# Patient Record
Sex: FEMALE | Race: WHITE | NOT HISPANIC OR LATINO | Employment: OTHER | RURAL
[De-identification: names, ages, dates, MRNs, and addresses within clinical notes are randomized per-mention and may not be internally consistent; named-entity substitution may affect disease eponyms.]

---
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
## 2022-01-24 ENCOUNTER — OFFICE VISIT (OUTPATIENT)
Dept: CARDIOLOGY | Facility: CLINIC | Age: 22
End: 2022-01-24

## 2022-01-24 VITALS
WEIGHT: 122 LBS | HEIGHT: 64 IN | SYSTOLIC BLOOD PRESSURE: 119 MMHG | HEART RATE: 73 BPM | BODY MASS INDEX: 20.83 KG/M2 | DIASTOLIC BLOOD PRESSURE: 66 MMHG

## 2022-01-24 DIAGNOSIS — R55 SYNCOPE, UNSPECIFIED SYNCOPE TYPE: Primary | ICD-10-CM

## 2022-01-24 DIAGNOSIS — R00.2 PALPITATIONS: ICD-10-CM

## 2022-01-24 PROCEDURE — 99212 OFFICE O/P EST SF 10 MIN: CPT | Performed by: SPECIALIST

## 2022-01-24 NOTE — PROGRESS NOTES
[facility]  Cardiology progress Note    Patient Name: Genesis Carver  : 2000    CHIEF COMPLAINT  Syncope.      Subjective   Subjective     HISTORY OF PRESENT ILLNESS    Genesis Carver is a 21 y.o. female with history of syncope.  Said no syncopal or presyncopal spell for the last year or so.    Review of Systems:   Constitutional no fever,  no weight loss   Skin no rash   Otolaryngeal no difficulty swallowing   Cardiovascular See HPI   Pulmonary no cough, no sputum production   Gastrointestinal no constipation, no diarrhea   Genitourinary no dysuria, no hematuria   Hematologic no easy bruisability, no abnormal bleeding   Musculoskeletal no muscle pain   Neurologic no dizziness, no falls         Personal History     Social History:  reports that she has never smoked. She has never used smokeless tobacco. She reports that she does not drink alcohol and does not use drugs.    Home Medications:  No current outpatient medications on file prior to visit.     No current facility-administered medications on file prior to visit.     Allergies:  No Known Allergies    Objective    Objective       Vitals:   Heart Rate:  [73] 73  BP: (119)/(66) 119/66  Body mass index is 20.94 kg/m².     Physical Exam:   Constitutional: Awake, alert, No acute distress    Eyes: PERRLA, sclerae anicteric, no conjunctival injection   HENT: NCAT, mucous membranes moist   Neck: Supple, no thyromegaly, no lymphadenopathy, trachea midline   Respiratory: Clear to auscultation bilaterally, nonlabored respirations    Cardiovascular: RRR, no murmurs or rubs. Palpable pedal pulses bilaterally   Musculoskeletal: No bilateral ankle edema, no cyanosis to extremities   Psychiatric: Appropriate affect, cooperative   Neurologic: Oriented x 3, strength symmetric in all extremities, Cranial Nerves grossly intact to confrontation, speech clear   Skin: No rashes.    Result Review    Result Review:  I have personally reviewed the available results  from  [x]  Laboratory  [x]  EKG  [x]  Cardiology  [x]  Medications  [x]  Old records  []  Other:   Procedures     Impression/Plan:  1.  Syncope unspecified: 24-hour Holter showed no significant arrhythmias.  Echocardiogram is within normal limits.  No further syncope.  Lifestyle modification advised again.           Иван Bang MD   01/24/22   11:22 EST

## 2022-09-09 ENCOUNTER — INITIAL PRENATAL (OUTPATIENT)
Dept: OBSTETRICS AND GYNECOLOGY | Facility: CLINIC | Age: 22
End: 2022-09-09

## 2022-09-09 VITALS — WEIGHT: 120 LBS | DIASTOLIC BLOOD PRESSURE: 77 MMHG | BODY MASS INDEX: 20.6 KG/M2 | SYSTOLIC BLOOD PRESSURE: 122 MMHG

## 2022-09-09 DIAGNOSIS — O20.0 THREATENED ABORTION: ICD-10-CM

## 2022-09-09 DIAGNOSIS — Z34.80 SUPERVISION OF OTHER NORMAL PREGNANCY, ANTEPARTUM: Primary | ICD-10-CM

## 2022-09-09 DIAGNOSIS — Z32.01 PREGNANCY TEST POSITIVE: ICD-10-CM

## 2022-09-09 LAB
ABO GROUP BLD: NORMAL
B-HCG UR QL: POSITIVE
BASOPHILS # BLD AUTO: 0.02 10*3/MM3 (ref 0–0.2)
BASOPHILS NFR BLD AUTO: 0.3 % (ref 0–1.5)
BLD GP AB SCN SERPL QL: NEGATIVE
C TRACH RRNA CVX QL NAA+PROBE: NOT DETECTED
DEPRECATED RDW RBC AUTO: 39.1 FL (ref 37–54)
EOSINOPHIL # BLD AUTO: 0.06 10*3/MM3 (ref 0–0.4)
EOSINOPHIL NFR BLD AUTO: 0.8 % (ref 0.3–6.2)
ERYTHROCYTE [DISTWIDTH] IN BLOOD BY AUTOMATED COUNT: 12.1 % (ref 12.3–15.4)
EXPIRATION DATE: ABNORMAL
GLUCOSE UR STRIP-MCNC: NEGATIVE MG/DL
HBV SURFACE AG SERPL QL IA: NORMAL
HCT VFR BLD AUTO: 42.9 % (ref 34–46.6)
HCV AB SER DONR QL: NORMAL
HGB BLD-MCNC: 13.6 G/DL (ref 12–15.9)
HIV1+2 AB SER QL: NORMAL
IMM GRANULOCYTES # BLD AUTO: 0.02 10*3/MM3 (ref 0–0.05)
IMM GRANULOCYTES NFR BLD AUTO: 0.3 % (ref 0–0.5)
INTERNAL NEGATIVE CONTROL: ABNORMAL
INTERNAL POSITIVE CONTROL: ABNORMAL
LYMPHOCYTES # BLD AUTO: 2.45 10*3/MM3 (ref 0.7–3.1)
LYMPHOCYTES NFR BLD AUTO: 32.6 % (ref 19.6–45.3)
Lab: ABNORMAL
MCH RBC QN AUTO: 28 PG (ref 26.6–33)
MCHC RBC AUTO-ENTMCNC: 31.7 G/DL (ref 31.5–35.7)
MCV RBC AUTO: 88.3 FL (ref 79–97)
MONOCYTES # BLD AUTO: 0.54 10*3/MM3 (ref 0.1–0.9)
MONOCYTES NFR BLD AUTO: 7.2 % (ref 5–12)
N GONORRHOEA RRNA SPEC QL NAA+PROBE: NOT DETECTED
NEUTROPHILS NFR BLD AUTO: 4.42 10*3/MM3 (ref 1.7–7)
NEUTROPHILS NFR BLD AUTO: 58.8 % (ref 42.7–76)
NRBC BLD AUTO-RTO: 0 /100 WBC (ref 0–0.2)
PLATELET # BLD AUTO: 222 10*3/MM3 (ref 140–450)
PMV BLD AUTO: 11.5 FL (ref 6–12)
PROT UR STRIP-MCNC: NEGATIVE MG/DL
RBC # BLD AUTO: 4.86 10*6/MM3 (ref 3.77–5.28)
RH BLD: POSITIVE
WBC NRBC COR # BLD: 7.51 10*3/MM3 (ref 3.4–10.8)

## 2022-09-09 PROCEDURE — G0432 EIA HIV-1/HIV-2 SCREEN: HCPCS | Performed by: OBSTETRICS & GYNECOLOGY

## 2022-09-09 PROCEDURE — 87491 CHLMYD TRACH DNA AMP PROBE: CPT | Performed by: OBSTETRICS & GYNECOLOGY

## 2022-09-09 PROCEDURE — 87086 URINE CULTURE/COLONY COUNT: CPT | Performed by: OBSTETRICS & GYNECOLOGY

## 2022-09-09 PROCEDURE — 87591 N.GONORRHOEAE DNA AMP PROB: CPT | Performed by: OBSTETRICS & GYNECOLOGY

## 2022-09-09 PROCEDURE — 86803 HEPATITIS C AB TEST: CPT | Performed by: OBSTETRICS & GYNECOLOGY

## 2022-09-09 PROCEDURE — 83020 HEMOGLOBIN ELECTROPHORESIS: CPT | Performed by: OBSTETRICS & GYNECOLOGY

## 2022-09-09 PROCEDURE — 81025 URINE PREGNANCY TEST: CPT | Performed by: OBSTETRICS & GYNECOLOGY

## 2022-09-09 PROCEDURE — 0501F PRENATAL FLOW SHEET: CPT | Performed by: OBSTETRICS & GYNECOLOGY

## 2022-09-09 NOTE — PROGRESS NOTES
OB Initial Visit    CC- Here for care of current pregnancy     Irregular menses, due date not determined at this time.  Ultrasound at [facility] noted yolk sac intrauterine and fetal pole.    Subjective:  22 y.o.  presenting for her first obstetrical visit.    LMP: Patient's last menstrual period was 2022. irregular    COMPLAINS OF: Vaginal spotting yesterday x1    Reviewed and updated:  OBHx, GYNHx (STDs), PMHx, Medications, Allergies, PSHx, Social Hx, Preventative Hx (PAP), Hx of abuse/safe environment, Vaccine Hx including hx of chickenpox or vaccine, Genetic Hx (pt, FOB, both families).        Objective:  /77   Wt 54.4 kg (120 lb)   LMP 2022   BMI 20.60 kg/m²   General- NAD, alert and oriented, appropriate  Psych- Normal mood, good memory  Neck- No masses, no thyroid enlargement  CV- Regular rhythm, no murnurs  Resp- CTA to bases, no wheezes  Abdomen- Soft,     External genitalia- Normal, no lesions  Urethra- Normal, no masses, non tender  Vagina- Normal, no discharge  Bladder- Normal, no masses, non tender  Cvx- Normal, no lesions, no discharge, no CMT  Uterus- Normal shape and consistency, non tender  Adnexa- Normal, no mass, non tender    Lymphatic- No palpable neck, axillary, or groin nodes  Ext- No edema, no cyanosis    Skin- No lesions, no rashes, no acanthosis nigricans      Assessment and Plan:  Diagnoses and all orders for this visit:    1. Supervision of other normal pregnancy, antepartum (Primary)  -     POC Urinalysis Dipstick  -     OB Panel With HIV  -     Hemoglobinopathy Fractionation Cascade  -     Chlamydia trachomatis, Neisseria gonorrhoeae, PCR - , Cervix  -     Urine Culture - Urine, Urine, Clean Catch    2. Pregnancy test positive  -     POC Pregnancy, Urine    3. Threatened   -     US Ob 14 + Weeks Single or First Gestation; Future        9w1d     Genetic Screening: Counseled.  Declines all.     Vaccines: Declines COVID vaccine    Counseling: Nutrition  discussed, calories, activity/exercise in pregnancy  Discussed dietary restrictions/safety food preparation in pregnancy  Appropriate trimester precautions provided, N/V, vag bleeding, cramping    Labs: Prenatal labs, cultures, and PAP performed (prn)    Return in about 2 weeks (around 9/23/2022).    Ricardo Swain Sr., MD  09/09/2022

## 2022-09-10 LAB
BACTERIA SPEC AEROBE CULT: NO GROWTH
RPR SER QL: NORMAL

## 2022-09-11 LAB — RUBV IGG SERPL IA-ACNC: 1.09 INDEX

## 2022-09-12 LAB
HGB A MFR BLD ELPH: 97.7 % (ref 96.4–98.8)
HGB A2 MFR BLD ELPH: 2.3 % (ref 1.8–3.2)
HGB F MFR BLD ELPH: 0 % (ref 0–2)
HGB FRACT BLD-IMP: NORMAL
HGB S MFR BLD ELPH: 0 %

## 2022-09-14 ENCOUNTER — APPOINTMENT (OUTPATIENT)
Dept: ULTRASOUND IMAGING | Facility: HOSPITAL | Age: 22
End: 2022-09-14

## 2022-09-14 ENCOUNTER — HOSPITAL ENCOUNTER (EMERGENCY)
Facility: HOSPITAL | Age: 22
Discharge: HOME | End: 2022-09-14
Attending: EMERGENCY MEDICINE | Admitting: EMERGENCY MEDICINE

## 2022-09-14 VITALS
SYSTOLIC BLOOD PRESSURE: 119 MMHG | OXYGEN SATURATION: 100 % | DIASTOLIC BLOOD PRESSURE: 67 MMHG | HEIGHT: 64 IN | TEMPERATURE: 98.4 F | WEIGHT: 119.71 LBS | HEART RATE: 86 BPM | RESPIRATION RATE: 18 BRPM | BODY MASS INDEX: 20.44 KG/M2

## 2022-09-14 DIAGNOSIS — O03.4 INCOMPLETE MISCARRIAGE: Primary | ICD-10-CM

## 2022-09-14 LAB
ABO GROUP BLD: NORMAL
ALBUMIN SERPL-MCNC: 4.9 G/DL (ref 3.5–5.2)
ALBUMIN/GLOB SERPL: 2.1 G/DL
ALP SERPL-CCNC: 77 U/L (ref 39–117)
ALT SERPL W P-5'-P-CCNC: 20 U/L (ref 1–33)
ANION GAP SERPL CALCULATED.3IONS-SCNC: 11.7 MMOL/L (ref 5–15)
AST SERPL-CCNC: 20 U/L (ref 1–32)
BACTERIA UR QL AUTO: ABNORMAL /HPF
BASOPHILS # BLD AUTO: 0.02 10*3/MM3 (ref 0–0.2)
BASOPHILS NFR BLD AUTO: 0.2 % (ref 0–1.5)
BILIRUB SERPL-MCNC: 0.2 MG/DL (ref 0–1.2)
BILIRUB UR QL STRIP: NEGATIVE
BUN SERPL-MCNC: 13 MG/DL (ref 6–20)
BUN/CREAT SERPL: 20 (ref 7–25)
CALCIUM SPEC-SCNC: 9.1 MG/DL (ref 8.6–10.5)
CHLORIDE SERPL-SCNC: 104 MMOL/L (ref 98–107)
CLARITY UR: CLEAR
CO2 SERPL-SCNC: 24.3 MMOL/L (ref 22–29)
COLOR UR: YELLOW
CREAT SERPL-MCNC: 0.65 MG/DL (ref 0.57–1)
DEPRECATED RDW RBC AUTO: 39.6 FL (ref 37–54)
EGFRCR SERPLBLD CKD-EPI 2021: 129.4 ML/MIN/1.73
EOSINOPHIL # BLD AUTO: 0.07 10*3/MM3 (ref 0–0.4)
EOSINOPHIL NFR BLD AUTO: 0.8 % (ref 0.3–6.2)
ERYTHROCYTE [DISTWIDTH] IN BLOOD BY AUTOMATED COUNT: 12.6 % (ref 12.3–15.4)
GLOBULIN UR ELPH-MCNC: 2.3 GM/DL
GLUCOSE SERPL-MCNC: 82 MG/DL (ref 65–99)
GLUCOSE UR STRIP-MCNC: NEGATIVE MG/DL
HCG INTACT+B SERPL-ACNC: 694.4 MIU/ML
HCT VFR BLD AUTO: 40.3 % (ref 34–46.6)
HGB BLD-MCNC: 13.2 G/DL (ref 12–15.9)
HGB UR QL STRIP.AUTO: ABNORMAL
HOLD SPECIMEN: 11
HOLD SPECIMEN: 11
HYALINE CASTS UR QL AUTO: ABNORMAL /LPF
IMM GRANULOCYTES # BLD AUTO: 0.02 10*3/MM3 (ref 0–0.05)
IMM GRANULOCYTES NFR BLD AUTO: 0.2 % (ref 0–0.5)
KETONES UR QL STRIP: NEGATIVE
LEUKOCYTE ESTERASE UR QL STRIP.AUTO: NEGATIVE
LYMPHOCYTES # BLD AUTO: 2.48 10*3/MM3 (ref 0.7–3.1)
LYMPHOCYTES NFR BLD AUTO: 29.2 % (ref 19.6–45.3)
MCH RBC QN AUTO: 28.3 PG (ref 26.6–33)
MCHC RBC AUTO-ENTMCNC: 32.8 G/DL (ref 31.5–35.7)
MCV RBC AUTO: 86.3 FL (ref 79–97)
MONOCYTES # BLD AUTO: 0.43 10*3/MM3 (ref 0.1–0.9)
MONOCYTES NFR BLD AUTO: 5.1 % (ref 5–12)
NEUTROPHILS NFR BLD AUTO: 5.48 10*3/MM3 (ref 1.7–7)
NEUTROPHILS NFR BLD AUTO: 64.5 % (ref 42.7–76)
NITRITE UR QL STRIP: NEGATIVE
NRBC BLD AUTO-RTO: 0 /100 WBC (ref 0–0.2)
PH UR STRIP.AUTO: 5.5 [PH] (ref 5–8)
PLATELET # BLD AUTO: 245 10*3/MM3 (ref 140–450)
PMV BLD AUTO: 10.2 FL (ref 6–12)
POTASSIUM SERPL-SCNC: 4 MMOL/L (ref 3.5–5.2)
PROT SERPL-MCNC: 7.2 G/DL (ref 6–8.5)
PROT UR QL STRIP: NEGATIVE
RBC # BLD AUTO: 4.67 10*6/MM3 (ref 3.77–5.28)
RBC # UR STRIP: ABNORMAL /HPF
REF LAB TEST METHOD: ABNORMAL
RH BLD: POSITIVE
SODIUM SERPL-SCNC: 140 MMOL/L (ref 136–145)
SP GR UR STRIP: 1.01 (ref 1–1.03)
SQUAMOUS #/AREA URNS HPF: ABNORMAL /HPF
UROBILINOGEN UR QL STRIP: ABNORMAL
WBC # UR STRIP: ABNORMAL /HPF
WBC NRBC COR # BLD: 8.5 10*3/MM3 (ref 3.4–10.8)
WHOLE BLOOD HOLD COAG: 11
WHOLE BLOOD HOLD SPECIMEN: 11

## 2022-09-14 PROCEDURE — 86900 BLOOD TYPING SEROLOGIC ABO: CPT | Performed by: EMERGENCY MEDICINE

## 2022-09-14 PROCEDURE — 80053 COMPREHEN METABOLIC PANEL: CPT | Performed by: EMERGENCY MEDICINE

## 2022-09-14 PROCEDURE — 86901 BLOOD TYPING SEROLOGIC RH(D): CPT | Performed by: EMERGENCY MEDICINE

## 2022-09-14 PROCEDURE — 85025 COMPLETE CBC W/AUTO DIFF WBC: CPT

## 2022-09-14 PROCEDURE — 87086 URINE CULTURE/COLONY COUNT: CPT | Performed by: EMERGENCY MEDICINE

## 2022-09-14 PROCEDURE — 99283 EMERGENCY DEPT VISIT LOW MDM: CPT

## 2022-09-14 PROCEDURE — 76817 TRANSVAGINAL US OBSTETRIC: CPT

## 2022-09-14 PROCEDURE — 84702 CHORIONIC GONADOTROPIN TEST: CPT

## 2022-09-14 PROCEDURE — 81001 URINALYSIS AUTO W/SCOPE: CPT | Performed by: EMERGENCY MEDICINE

## 2022-09-14 PROCEDURE — 36415 COLL VENOUS BLD VENIPUNCTURE: CPT

## 2022-09-14 RX ORDER — SODIUM CHLORIDE 0.9 % (FLUSH) 0.9 %
10 SYRINGE (ML) INJECTION AS NEEDED
Status: DISCONTINUED | OUTPATIENT
Start: 2022-09-14 | End: 2022-09-14 | Stop reason: HOSPADM

## 2022-09-14 NOTE — ED PROVIDER NOTES
Time: 3:49 PM EDT  Arrived by: private car  Chief Complaint: Vaginal bleeding  History provided by: patient, spouse  History is limited by: N/A     History of Present Illness:  Patient is a 22 y.o. year old female who presents to the emergency department with worsening vaginal bleeding and abdominal cramping x 1 week. Pt is about 7 weeks pregnant. She was recently seen by [facility] for similar symptoms.       History provided by:  Patient and spouse   used: No        Similar Symptoms Previously: yes  Recently seen: yes      Patient Care Team  Primary Care Provider: Brooke Merrill APRN    Past Medical History:     No Known Allergies  Past Medical History:   Diagnosis Date   • Reflex tachycardia      No past surgical history on file.  Family History   Problem Relation Age of Onset   • No Known Problems Father    • Breast cancer Neg Hx    • Ovarian cancer Neg Hx    • Uterine cancer Neg Hx    • Colon cancer Neg Hx    • Melanoma Neg Hx    • Prostate cancer Neg Hx        Home Medications:  Prior to Admission medications    Medication Sig Start Date End Date Taking? Authorizing Provider   Prenatal MV-Min-Fe Fum-FA-DHA (PRENATAL+DHA PO) Take  by mouth.    Provider, Mirian, MD        Social History:   Social History     Tobacco Use   • Smoking status: Never Smoker   • Smokeless tobacco: Never Used   Vaping Use   • Vaping Use: Former   • Substances: Nicotine   • Devices: Refillable tank   Substance Use Topics   • Alcohol use: Never   • Drug use: Never     Recent travel: no     Review of Systems:  Review of Systems   Constitutional: Negative for chills and fever.   HENT: Negative for congestion, ear pain and sore throat.    Eyes: Negative for pain.   Respiratory: Negative for cough, chest tightness and shortness of breath.    Cardiovascular: Negative for chest pain.   Gastrointestinal: Positive for abdominal pain. Negative for diarrhea, nausea and vomiting.   Genitourinary: Positive for  "vaginal bleeding. Negative for flank pain and hematuria.   Musculoskeletal: Negative for joint swelling.   Skin: Negative for pallor.   Neurological: Negative for seizures and headaches.   All other systems reviewed and are negative.       Physical Exam:  /67 (BP Location: Right arm)   Pulse 86   Temp 98.4 °F (36.9 °C) (Oral)   Resp 18   Ht 162.6 cm (64\")   Wt 54.3 kg (119 lb 11.4 oz)   LMP 07/07/2022   SpO2 100%   BMI 20.55 kg/m²     Physical Exam  Vitals and nursing note reviewed.   Constitutional:       General: She is not in acute distress.     Appearance: Normal appearance. She is not toxic-appearing.   HENT:      Head: Normocephalic and atraumatic.      Jaw: There is normal jaw occlusion.   Eyes:      General: Lids are normal.      Extraocular Movements: Extraocular movements intact.      Conjunctiva/sclera: Conjunctivae normal.      Pupils: Pupils are equal, round, and reactive to light.   Cardiovascular:      Rate and Rhythm: Normal rate and regular rhythm.      Pulses: Normal pulses.      Heart sounds: Normal heart sounds.   Pulmonary:      Effort: Pulmonary effort is normal. No respiratory distress.      Breath sounds: Normal breath sounds. No wheezing or rhonchi.   Abdominal:      General: Abdomen is flat.      Palpations: Abdomen is soft.      Tenderness: There is no abdominal tenderness. There is no guarding or rebound.   Musculoskeletal:         General: Normal range of motion.      Cervical back: Normal range of motion and neck supple.      Right lower leg: No edema.      Left lower leg: No edema.   Skin:     General: Skin is warm and dry.   Neurological:      Mental Status: She is alert and oriented to person, place, and time. Mental status is at baseline.   Psychiatric:         Mood and Affect: Mood normal.                Medications in the Emergency Department:  Medications   sodium chloride 0.9 % flush 10 mL (has no administration in time range)        Labs  Lab Results (last 24 " hours)     Procedure Component Value Units Date/Time    CBC & Differential [[id number]]  (Normal) Collected: 09/14/22 1433    Specimen: Blood Updated: 09/14/22 1456    Narrative:      The following orders were created for panel order CBC & Differential.  Procedure                               Abnormality         Status                     ---------                               -----------         ------                     CBC Auto Differential[[id number]]        Normal              Final result                 Please view results for these tests on the individual orders.    hCG, Quantitative, Pregnancy [[id number]] Collected: 09/14/22 1433    Specimen: Blood Updated: 09/14/22 1512     HCG Quantitative 694.40 mIU/mL     Narrative:      HCG Ranges by Gestational Age    Females - non-pregnant premenopausal   </= 1mIU/mL HCG  Females - postmenopausal               </= 7mIU/mL HCG    3 Weeks       5.4   -      72 mIU/mL  4 Weeks      10.2   -     708 mIU/mL  5 Weeks       217   -   8,245 mIU/mL  6 Weeks       152   -  32,177 mIU/mL  7 Weeks     4,059   - 153,767 mIU/mL  8 Weeks    31,366   - 149,094 mIU/mL  9 Weeks    59,109   - 135,901 mIU/mL  10 Weeks   44,186   - 170,409 mIU/mL  12 Weeks   27,107   - 201,615 mIU/mL  14 Weeks   24,302   -  93,646 mIU/mL  15 Weeks   12,540   -  69,747 mIU/mL  16 Weeks    8,904   -  55,332 mIU/mL  17 Weeks    8,240   -  51,793 mIU/mL  18 Weeks    9,649   -  55,271 mIU/mL    Results may be falsely decreased if patient taking Biotin.      CBC Auto Differential [[id number]]  (Normal) Collected: 09/14/22 1433    Specimen: Blood Updated: 09/14/22 1456     WBC 8.50 10*3/mm3      RBC 4.67 10*6/mm3      Hemoglobin 13.2 g/dL      Hematocrit 40.3 %      MCV 86.3 fL      MCH 28.3 pg      MCHC 32.8 g/dL      RDW 12.6 %      RDW-SD 39.6 fl      MPV 10.2 fL      Platelets 245 10*3/mm3      Neutrophil % 64.5 %      Lymphocyte % 29.2 %      Monocyte % 5.1 %      Eosinophil % 0.8 %      Basophil % 0.2 %       Immature Grans % 0.2 %      Neutrophils, Absolute 5.48 10*3/mm3      Lymphocytes, Absolute 2.48 10*3/mm3      Monocytes, Absolute 0.43 10*3/mm3      Eosinophils, Absolute 0.07 10*3/mm3      Basophils, Absolute 0.02 10*3/mm3      Immature Grans, Absolute 0.02 10*3/mm3      nRBC 0.0 /100 WBC     Comprehensive Metabolic Panel [[id number]] Collected: 09/14/22 1433    Specimen: Blood Updated: 09/14/22 1640     Glucose 82 mg/dL      BUN 13 mg/dL      Creatinine 0.65 mg/dL      Sodium 140 mmol/L      Potassium 4.0 mmol/L      Chloride 104 mmol/L      CO2 24.3 mmol/L      Calcium 9.1 mg/dL      Total Protein 7.2 g/dL      Albumin 4.90 g/dL      ALT (SGPT) 20 U/L      AST (SGOT) 20 U/L      Alkaline Phosphatase 77 U/L      Total Bilirubin 0.2 mg/dL      Globulin 2.3 gm/dL      A/G Ratio 2.1 g/dL      BUN/Creatinine Ratio 20.0     Anion Gap 11.7 mmol/L      eGFR 129.4 mL/min/1.73      Comment: National Kidney Foundation and American Society of Nephrology (ASN) Task Force recommended calculation based on the Chronic Kidney Disease Epidemiology Collaboration (CKD-EPI) equation refit without adjustment for race.       Narrative:      GFR Normal >60  Chronic Kidney Disease <60  Kidney Failure <15      Urinalysis With Culture If Indicated - Urine, Clean Catch [[id number]]  (Abnormal) Collected: 09/14/22 1707    Specimen: Urine, Clean Catch Updated: 09/14/22 1717     Color, UA Yellow     Appearance, UA Clear     pH, UA 5.5     Specific Gravity, UA 1.008     Glucose, UA Negative     Ketones, UA Negative     Bilirubin, UA Negative     Blood, UA Small (1+)     Protein, UA Negative     Leuk Esterase, UA Negative     Nitrite, UA Negative     Urobilinogen, UA 0.2 E.U./dL    Narrative:      In absence of clinical symptoms, the presence of pyuria, bacteria, and/or nitrites on the urinalysis result does not correlate with infection.    Urinalysis, Microscopic Only - Urine, Clean Catch [[id number]]  (Abnormal) Collected: 09/14/22 1707     Specimen: Urine, Clean Catch Updated: 22     RBC, UA 0-2 /HPF      WBC, UA 0-2 /HPF      Bacteria, UA None Seen /HPF      Squamous Epithelial Cells, UA 0-2 /HPF      Hyaline Casts, UA None Seen /LPF      Methodology Automated Microscopy    Urine Culture - Urine, Urine, Clean Catch [[id number]] Collected: 22    Specimen: Urine, Clean Catch Updated: 22           Imaging:  US Ob Transvaginal    Result Date: 2022  PROCEDURE: US OB TRANSVAGINAL  COMPARISON: None  INDICATIONS: early pregnancy, low abd cramping/ vaginal bleeding  TECHNIQUE: Transvaginal OB pelvic ultrasound.   FINDINGS:   A normal intrauterine gestation is not identified.  The uterus measures 6.2 cm x 4.6 cm x 4 cm.  The endometrium has a thickened heterogeneous appearance measuring 1.5 cm.  There is a 1.2 cm x 4 mm anechoic sac without evidence of fetal pole or yolk sac.  There is a small amount of pelvic free fluid adjacent to the right ovary.  Follicular cysts are present in the right ovary.  The right ovary is not well visualized.  The left ovary measures 2.6 cm x 3.4 cm x 1.7 cm.  Follicular cysts are present.   IMPRESSION: 1. A normal intrauterine gestation is not identified.  2. Findings suggest spontaneous  in progress.  The patient reportedly had a normal appearing intrauterine gestation on a recent ultrasound at [facility].   ROBIN LARKIN MD       Electronically Signed and Approved By: ROBIN LARKIN MD on 2022 at 16:56               Procedures:  Procedures    Progress                            Medical Decision Making:  MDM  Number of Diagnoses or Management Options  Incomplete miscarriage  Diagnosis management comments: In summary this is a 22-year-old female who presents to the emergency department with reports of early pregnancy with vaginal bleeding and cramping.  She reports being seen at outside hospital emergency department recently reportedly had a quantitative hCG  level of 1000.  Ultrasound at that time reportedly revealed intrauterine pregnancy.  She presents now complaining of increased vaginal bleeding now with abdominal cramping.  Work-up today reveals decreased quantitative hCG level and transvaginal ultrasound reveals an anechoic sac without fetal pole consistent with miscarriage.  Additional work-up including CBC and CMP are unremarkable.  Urinalysis is unremarkable.  Blood type Rh+.  Very strict return to ER and follow-up instructions have been provided to the patient.         Amount and/or Complexity of Data Reviewed  Clinical lab tests: reviewed  Tests in the radiology section of CPT®: reviewed    Patient Progress  Patient progress: resolved (17:07 EDT Updated patient on ultrasound results and plan to follow up with OB/GYN. Patient expressed understanding and agreement. All questions answered at this time.   )       Final diagnoses:   Incomplete miscarriage        Disposition:  ED Disposition     ED Disposition   Discharge    Condition   Stable    Comment   --             This medical record created using voice recognition software.        I, Anna Cantu, am scribing for Dr. Brian Mendes. Information recorded by the scribe has been verified and validated by the provider.       Cantu, Anna C  09/14/22 0325       Cantu, Anna C  09/14/22 171       Brian Mendes MD  09/14/22 6340

## 2022-09-15 LAB — BACTERIA SPEC AEROBE CULT: NO GROWTH

## 2022-09-27 ENCOUNTER — OFFICE VISIT (OUTPATIENT)
Dept: OBSTETRICS AND GYNECOLOGY | Facility: CLINIC | Age: 22
End: 2022-09-27

## 2022-09-27 ENCOUNTER — ROUTINE PRENATAL (OUTPATIENT)
Dept: OBSTETRICS AND GYNECOLOGY | Facility: CLINIC | Age: 22
End: 2022-09-27

## 2022-09-27 VITALS — BODY MASS INDEX: 20.77 KG/M2 | WEIGHT: 121 LBS | DIASTOLIC BLOOD PRESSURE: 64 MMHG | SYSTOLIC BLOOD PRESSURE: 105 MMHG

## 2022-09-27 VITALS
HEART RATE: 68 BPM | BODY MASS INDEX: 20.66 KG/M2 | WEIGHT: 121 LBS | SYSTOLIC BLOOD PRESSURE: 105 MMHG | DIASTOLIC BLOOD PRESSURE: 64 MMHG | HEIGHT: 64 IN

## 2022-09-27 DIAGNOSIS — Z53.21 PATIENT LEFT WITHOUT BEING SEEN: ICD-10-CM

## 2022-09-27 DIAGNOSIS — Z34.80 SUPERVISION OF OTHER NORMAL PREGNANCY, ANTEPARTUM: ICD-10-CM

## 2022-09-27 DIAGNOSIS — O03.9 COMPLETE MISCARRIAGE: Primary | ICD-10-CM

## 2022-09-27 DIAGNOSIS — N83.201 CYSTS OF BOTH OVARIES: ICD-10-CM

## 2022-09-27 DIAGNOSIS — N83.202 CYSTS OF BOTH OVARIES: ICD-10-CM

## 2022-09-27 LAB
GLUCOSE UR STRIP-MCNC: NEGATIVE MG/DL
HCG INTACT+B SERPL-ACNC: 10 MIU/ML
PROT UR STRIP-MCNC: NEGATIVE MG/DL

## 2022-09-27 PROCEDURE — 81002 URINALYSIS NONAUTO W/O SCOPE: CPT | Performed by: OBSTETRICS & GYNECOLOGY

## 2022-09-27 PROCEDURE — 84702 CHORIONIC GONADOTROPIN TEST: CPT | Performed by: OBSTETRICS & GYNECOLOGY

## 2022-09-27 PROCEDURE — 99214 OFFICE O/P EST MOD 30 MIN: CPT | Performed by: OBSTETRICS & GYNECOLOGY

## 2022-09-28 ENCOUNTER — TELEPHONE (OUTPATIENT)
Dept: OBSTETRICS AND GYNECOLOGY | Facility: CLINIC | Age: 22
End: 2022-09-28

## 2022-09-28 NOTE — TELEPHONE ENCOUNTER
Patient called back and was advised of her results. Please place the order for the ultrasound to be scheduled at the appropriate time.

## 2022-09-28 NOTE — TELEPHONE ENCOUNTER
----- Message from Indio Soliz MD sent at 9/28/2022  7:44 AM EDT -----  Please notify the patient's hCG has fallen significantly.  It is now 10.  It was 694 2 weeks ago.  This is consistent with complete passage of the pregnancy.  No further hCGs are required.  The patient will follow-up after her ultrasound to reevaluate the ovarian cyst noted on her ultrasound yesterday

## 2022-09-29 PROBLEM — N83.202 CYSTS OF BOTH OVARIES: Status: ACTIVE | Noted: 2022-09-29

## 2022-09-29 PROBLEM — N83.201 CYSTS OF BOTH OVARIES: Status: ACTIVE | Noted: 2022-09-29

## 2022-09-29 PROBLEM — O03.9 COMPLETE MISCARRIAGE: Status: ACTIVE | Noted: 2022-09-29

## 2022-09-29 NOTE — ASSESSMENT & PLAN NOTE
I reviewed the patient's ultrasound findings with her.  Both of the cystic structures noted to have a complex nature.  The patient did report having a difficult time conceiving this pregnancy thus raising the concern for a possible endometrioma versus just a hemorrhagic cyst.  We will reevaluate in 4 to 6 weeks for any interval change.  At that time we can make a decision about proceeding with any further evaluation if necessary.

## 2022-09-29 NOTE — ASSESSMENT & PLAN NOTE
The patient's symptoms and ultrasound findings are most consistent with a completed miscarriage.  We will check a beta-hCG today to confirm an appropriate decline.  I discussed miscarriage in detail and the most common etiologies of miscarriage.  We discussed timing of future pregnancies.  I have recommended the patient wait until she has at least 1 normal menstrual cycle.  I have recommended she continue multivitamin with folic acid or prenatal vitamins if planning a pregnancy anytime soon or planning not to prevent pregnancy.  The patient declines prescription contraceptives at this time.

## 2022-09-29 NOTE — PROGRESS NOTES
"GYN Visit    CC: Follow-up miscarriage    HPI:   22 y.o. who presents in follow-up from an ER visit for miscarriage.  The patient was seen on 2022 for vaginal bleeding.  At that time she was told that this was likely going to result in miscarriage.  She reports that she miscarried about 2 days later at home.  She feels confident she passed the pregnancy.  Her bleeding got very heavy with clots for short time and as soon as the pregnancy passed became significantly better.  The patient reports no vaginal bleeding at the present time.  She is here for follow-up ultrasound.      History: PMHx, Meds, Allergies, PSHx, Social Hx, and POBHx all reviewed and updated.    /64   Pulse 68   Ht 162.6 cm (64\")   Wt 54.9 kg (121 lb)   LMP 2022   Breastfeeding No   BMI 20.77 kg/m²     Physical Exam  Vitals and nursing note reviewed. Exam conducted with a chaperone present.   Constitutional:       General: She is not in acute distress.     Appearance: Normal appearance. She is normal weight. She is not ill-appearing.   Skin:     General: Skin is warm and dry.      Findings: No lesion or rash.   Neurological:      Mental Status: She is alert and oriented to person, place, and time.   Psychiatric:         Mood and Affect: Mood normal.         Behavior: Behavior normal.         Thought Content: Thought content normal.         Judgment: Judgment normal.       ED physician note reviewed from 2022.  Pelvic ultrasound from 2022 reviewed  Labs from 2022 reviewed including CMP, hCG, CBC, type and screen, urinalysis, and urine culture    ASSESSMENT AND PLAN:  Diagnoses and all orders for this visit:    1. Complete miscarriage (Primary)  Assessment & Plan:  The patient's symptoms and ultrasound findings are most consistent with a completed miscarriage.  We will check a beta-hCG today to confirm an appropriate decline.  I discussed miscarriage in detail and the most common etiologies of miscarriage.  We " discussed timing of future pregnancies.  I have recommended the patient wait until she has at least 1 normal menstrual cycle.  I have recommended she continue multivitamin with folic acid or prenatal vitamins if planning a pregnancy anytime soon or planning not to prevent pregnancy.  The patient declines prescription contraceptives at this time.      2. Cysts of both ovaries  Assessment & Plan:  I reviewed the patient's ultrasound findings with her.  Both of the cystic structures noted to have a complex nature.  The patient did report having a difficult time conceiving this pregnancy thus raising the concern for a possible endometrioma versus just a hemorrhagic cyst.  We will reevaluate in 4 to 6 weeks for any interval change.  At that time we can make a decision about proceeding with any further evaluation if necessary.    Orders:  -     US Pelvis Transvaginal Non OB; Future        Follow Up:  Return in about 4 weeks (around 10/25/2022) for Recheck.    Indio Soliz MD  09/27/2022

## 2022-10-19 NOTE — PROGRESS NOTES
The patient left the office before care was provided and did not complete the visit the appointment was canceled.

## 2022-10-26 ENCOUNTER — OFFICE VISIT (OUTPATIENT)
Dept: OBSTETRICS AND GYNECOLOGY | Facility: CLINIC | Age: 22
End: 2022-10-26

## 2022-10-26 VITALS
HEIGHT: 64 IN | SYSTOLIC BLOOD PRESSURE: 106 MMHG | WEIGHT: 122 LBS | DIASTOLIC BLOOD PRESSURE: 74 MMHG | BODY MASS INDEX: 20.83 KG/M2 | HEART RATE: 80 BPM

## 2022-10-26 DIAGNOSIS — N83.201 CYSTS OF BOTH OVARIES: Primary | ICD-10-CM

## 2022-10-26 DIAGNOSIS — N83.202 CYSTS OF BOTH OVARIES: Primary | ICD-10-CM

## 2022-10-26 PROBLEM — O03.9 COMPLETE MISCARRIAGE: Status: RESOLVED | Noted: 2022-09-29 | Resolved: 2022-10-26

## 2022-10-26 PROCEDURE — 99213 OFFICE O/P EST LOW 20 MIN: CPT | Performed by: OBSTETRICS & GYNECOLOGY

## 2022-10-26 NOTE — PROGRESS NOTES
"GYN Visit    CC: Follow-up ultrasound    HPI:   22 y.o. who presents in follow-up of a pelvic ultrasound for bilateral ovarian cyst.  The patient had a recent miscarriage and during the evaluation that miscarriage ovarian cyst were noted on both sides.  Very little description was noted on these ovarian cyst.  The patient was asymptomatic at that time.  Since her miscarriage the patient reports that her menstrual cycle has returned and is normal.  She has no concerns today.  She would like to start trying for pregnancy again.    History: PMHx, Meds, Allergies, PSHx, Social Hx, and POBHx all reviewed and updated.    /74   Pulse 80   Ht 162.6 cm (64\")   Wt 55.3 kg (122 lb)   LMP 2022   Breastfeeding No   BMI 20.94 kg/m²     Physical Exam  Vitals and nursing note reviewed.   Musculoskeletal:         General: No swelling.      Right lower leg: No edema.      Left lower leg: No edema.   Skin:     General: Skin is warm and dry.      Findings: No rash.   Neurological:      Mental Status: She is oriented to person, place, and time.   Psychiatric:         Mood and Affect: Mood normal.         Behavior: Behavior normal.         Thought Content: Thought content normal.         Judgment: Judgment normal.       Ultrasound from 10/26/2022 reviewed    ASSESSMENT AND PLAN:  Diagnoses and all orders for this visit:    1. Cysts of both ovaries (Primary)  Assessment & Plan:  Today's ultrasound demonstrates no ovarian cysts.  There are multiple follicles noted.  The patient does not have any abnormal uterine bleeding suggestive of PCOS.  There are also not enough follicles noted on each ovary to qualify for the ultrasound finding of polycystic appearing ovaries.  I discussed PCOS with the patient.  I do not suspect the patient has PCOS at this time.  I did discussed signs and symptoms of PCOS and asked the patient to return if these develop.  Patient is planning to try again for pregnancy.  I think since she has " resumed normal menstruation she can try again.  I do recommend she take prenatal vitamins daily.  She will notify me with the missed period and positive pregnancy test.          Follow Up:  Return if symptoms worsen or fail to improve.    Indio Soliz MD  10/26/2022

## 2022-10-26 NOTE — ASSESSMENT & PLAN NOTE
Today's ultrasound demonstrates no ovarian cysts.  There are multiple follicles noted.  The patient does not have any abnormal uterine bleeding suggestive of PCOS.  There are also not enough follicles noted on each ovary to qualify for the ultrasound finding of polycystic appearing ovaries.  I discussed PCOS with the patient.  I do not suspect the patient has PCOS at this time.  I did discussed signs and symptoms of PCOS and asked the patient to return if these develop.  Patient is planning to try again for pregnancy.  I think since she has resumed normal menstruation she can try again.  I do recommend she take prenatal vitamins daily.  She will notify me with the missed period and positive pregnancy test.   X Size Of Lesion In Cm (Optional): 0 Detail Level: Detailed Size Of Lesion In Cm (Optional): 0.3

## 2022-11-07 ENCOUNTER — TELEPHONE (OUTPATIENT)
Dept: SURGERY | Facility: OTHER | Age: 22
End: 2022-11-07

## 2022-11-07 NOTE — TELEPHONE ENCOUNTER
Caller: Genesis Carver    Relationship to patient: Self    Best call back number: [phone]    Patient is needing:  PATIENT WAS TOLD BY  TO CALL WHEN SHE RECEIVED A POSITIVE PREGNANCY TEST. BECAUSE OF HISTORY OF MISCARRIAGE. PATIENT STATED SHE  GOT FIRST POSITIVE ON OCT.31.2022, AND HAS GOTTEN ABOUT 5 POSITIVES SINCE.

## 2022-11-08 ENCOUNTER — LAB (OUTPATIENT)
Dept: OBSTETRICS AND GYNECOLOGY | Facility: CLINIC | Age: 22
End: 2022-11-08

## 2022-11-08 DIAGNOSIS — O20.0 THREATENED ABORTION: ICD-10-CM

## 2022-11-08 DIAGNOSIS — O20.0 THREATENED ABORTION: Primary | ICD-10-CM

## 2022-11-08 LAB — HCG INTACT+B SERPL-ACNC: 953 MIU/ML

## 2022-11-08 PROCEDURE — 84702 CHORIONIC GONADOTROPIN TEST: CPT | Performed by: OBSTETRICS & GYNECOLOGY

## 2022-11-08 NOTE — TELEPHONE ENCOUNTER
Patient called back and advised of Dr. Soliz's recommendations. She is coming in today and Thursday for the HCG labs. She understands further follow up will be based on those results.

## 2022-11-10 ENCOUNTER — LAB (OUTPATIENT)
Dept: OBSTETRICS AND GYNECOLOGY | Facility: CLINIC | Age: 22
End: 2022-11-10

## 2022-11-10 DIAGNOSIS — O20.0 THREATENED ABORTION: ICD-10-CM

## 2022-11-10 LAB — HCG INTACT+B SERPL-ACNC: 2177 MIU/ML

## 2022-11-10 PROCEDURE — 84702 CHORIONIC GONADOTROPIN TEST: CPT | Performed by: OBSTETRICS & GYNECOLOGY

## 2022-11-11 DIAGNOSIS — O20.0 THREATENED MISCARRIAGE: Primary | ICD-10-CM

## 2022-11-11 NOTE — PROGRESS NOTES
Please notify the patient that her hCG has risen appropriately.  I would like her to have a viability ultrasound in about a week, with a follow-up with me after as a GYN visit.

## 2022-11-16 ENCOUNTER — OFFICE VISIT (OUTPATIENT)
Dept: OBSTETRICS AND GYNECOLOGY | Facility: CLINIC | Age: 22
End: 2022-11-16

## 2022-11-16 VITALS
HEART RATE: 76 BPM | WEIGHT: 122 LBS | DIASTOLIC BLOOD PRESSURE: 65 MMHG | BODY MASS INDEX: 20.94 KG/M2 | SYSTOLIC BLOOD PRESSURE: 98 MMHG

## 2022-11-16 DIAGNOSIS — O20.0 THREATENED ABORTION: Primary | ICD-10-CM

## 2022-11-16 PROCEDURE — 99213 OFFICE O/P EST LOW 20 MIN: CPT | Performed by: OBSTETRICS & GYNECOLOGY

## 2022-11-16 NOTE — ASSESSMENT & PLAN NOTE
Doing well.  Ultrasound today shows a collins intrauterine pregnancy with cardiac activity.  SAB warnings reviewed.  Plan to follow-up ultrasound in 4 weeks along with new OB visit.  I have given SAB warnings and call/return instructions to the patient.  She will continue prenatal vitamins.  If her nausea increases she will notify me for medications

## 2022-11-16 NOTE — PROGRESS NOTES
GYN Visit    CC: Follow-up ultrasound    HPI:   22 y.o. who presents in follow-up of an ultrasound for an early threatened miscarriage.  The patient is doing well.  She denies any current bleeding or pelvic pain.  She does report some nausea but no vomiting.  No other concerns today.  Just recently had a miscarriage.      History: PMHx, Meds, Allergies, PSHx, Social Hx, and POBHx all reviewed and updated.    BP 98/65   Pulse 76   Wt 55.3 kg (122 lb)   LMP 10/05/2022 (Exact Date)   Breastfeeding No   BMI 20.94 kg/m²     Physical Exam  Vitals and nursing note reviewed.   Constitutional:       Appearance: Normal appearance.   Musculoskeletal:         General: No swelling.      Right lower leg: No edema.      Left lower leg: No edema.   Skin:     General: Skin is warm and dry.      Findings: No rash.   Neurological:      Mental Status: She is alert and oriented to person, place, and time.   Psychiatric:         Mood and Affect: Mood normal.         Behavior: Behavior normal.         Thought Content: Thought content normal.         Judgment: Judgment normal.           ASSESSMENT AND PLAN:  Diagnoses and all orders for this visit:    1. Threatened  (Primary)  Assessment & Plan:  Doing well.  Ultrasound today shows a collins intrauterine pregnancy with cardiac activity.  SAB warnings reviewed.  Plan to follow-up ultrasound in 4 weeks along with new OB visit.  I have given SAB warnings and call/return instructions to the patient.  She will continue prenatal vitamins.  If her nausea increases she will notify me for medications    Orders:  -     US Ob 14 + Weeks Single or First Gestation; Future      Follow Up:  Return in about 4 weeks (around 2022) for new ob.    Indio Soliz MD  2022

## 2022-11-18 ENCOUNTER — TELEPHONE (OUTPATIENT)
Dept: OBSTETRICS AND GYNECOLOGY | Facility: CLINIC | Age: 22
End: 2022-11-18

## 2022-11-18 NOTE — TELEPHONE ENCOUNTER
PT IS 6 WEEKS AND IS WANTING TO KNOW WHAT SHE CAN TAKE FOR NAUSEA. PLEASE ADVISE PT, SHE CAN BE REACHED ANYTIME, OK TO LVM.

## 2022-11-21 NOTE — TELEPHONE ENCOUNTER
Patient states she had called in over the weekend and a script or Zofran has been sent to her pharmacy.

## 2022-11-23 ENCOUNTER — TELEPHONE (OUTPATIENT)
Dept: OBSTETRICS AND GYNECOLOGY | Facility: CLINIC | Age: 22
End: 2022-11-23

## 2022-11-23 NOTE — TELEPHONE ENCOUNTER
Caller: Genesis Carver    Relationship to patient: Self    Best call back number: [phone]    Patient is needing: PATIENT WAS ADVISED BY HER PCP TO LET  KNOW SHE WAS GIVEN FLUIDS YESTERDAY AT THE PRACTICE AND A PRESCRIPTION FOR ACID REFLUX.   NO CALLBACK IS NEEDED AT THIS TIME. PATIENT IS FEELING BETTER.

## 2022-12-02 ENCOUNTER — TELEPHONE (OUTPATIENT)
Dept: OBSTETRICS AND GYNECOLOGY | Facility: CLINIC | Age: 22
End: 2022-12-02

## 2022-12-02 RX ORDER — ONDANSETRON HYDROCHLORIDE 8 MG/1
8 TABLET, FILM COATED ORAL EVERY 8 HOURS PRN
Qty: 20 TABLET | Refills: 1 | Status: SHIPPED | OUTPATIENT
Start: 2022-12-02 | End: 2022-12-20

## 2022-12-02 NOTE — TELEPHONE ENCOUNTER
JULIANNA  I had originally sent this to Dr. Soliz but he is out of the office.  The patient requests a refill on Zofran.  The patient said she had last received Zofran from an on-call nurse when she called in after-hours on 11/18/22.  She has ran out of the prescription and is still having nausea and vomiting.  Please advise.  Thank you.

## 2022-12-02 NOTE — TELEPHONE ENCOUNTER
Caller: Marquis Carveree    Relationship: Self    Best call back number: [phone]    Requested Prescriptions:   ZOFRAN -        Pharmacy where request should be sent:[address] [phone] [facility] [phone] [facility]  [phone]          Does the patient have less than a 3 day supply:  [x] Yes  [] No    Would you like a call back once the refill request has been completed: [x] Yes [] No    If the office needs to give you a call back, can they leave a voicemail: [x] Yes [] No

## 2022-12-02 NOTE — TELEPHONE ENCOUNTER
I have sent a message to her provider.  For more information, reference telephone encounter from 11/18/2022.

## 2022-12-05 ENCOUNTER — TELEPHONE (OUTPATIENT)
Dept: OBSTETRICS AND GYNECOLOGY | Facility: CLINIC | Age: 22
End: 2022-12-05

## 2022-12-05 NOTE — TELEPHONE ENCOUNTER
Patient partner called requesting an Rx for Zofran.  Informed him Rx was at the pharmacy for Zofran.

## 2022-12-15 ENCOUNTER — TELEPHONE (OUTPATIENT)
Dept: OBSTETRICS AND GYNECOLOGY | Facility: CLINIC | Age: 22
End: 2022-12-15

## 2022-12-15 NOTE — TELEPHONE ENCOUNTER
Spoke with Yossi and let him know that we did not have any other openings for ultrasounds within the next few weeks. Pt v/u.

## 2022-12-15 NOTE — TELEPHONE ENCOUNTER
Caller: INDERJIT BALDERAS    Relationship to patient: Emergency Contact/    Best call back number: [phone]    INDERJIT BALDERAS'S WIFE HAS APPT ON 12-20 FOR NEW OB. HOWEVER, HE IS STARTING THE [facility] [employer] THAT DAY & HE DOES NOT WANT TO MISS HER 1ST APPT.   COULD SOMEONE CALL HIM BACK AND R/S FOR ANY OTHER DAY NEXT WEEK?  THANKS

## 2022-12-20 ENCOUNTER — INITIAL PRENATAL (OUTPATIENT)
Dept: OBSTETRICS AND GYNECOLOGY | Facility: CLINIC | Age: 22
End: 2022-12-20

## 2022-12-20 VITALS — BODY MASS INDEX: 21.97 KG/M2 | SYSTOLIC BLOOD PRESSURE: 109 MMHG | DIASTOLIC BLOOD PRESSURE: 75 MMHG | WEIGHT: 128 LBS

## 2022-12-20 DIAGNOSIS — Z34.80 SUPERVISION OF OTHER NORMAL PREGNANCY, ANTEPARTUM: Primary | ICD-10-CM

## 2022-12-20 PROBLEM — O20.0 THREATENED ABORTION: Status: RESOLVED | Noted: 2022-11-16 | Resolved: 2022-12-20

## 2022-12-20 LAB
ABO GROUP BLD: NORMAL
BASOPHILS # BLD AUTO: 0.01 10*3/MM3 (ref 0–0.2)
BASOPHILS NFR BLD AUTO: 0.1 % (ref 0–1.5)
BLD GP AB SCN SERPL QL: NEGATIVE
C TRACH RRNA CVX QL NAA+PROBE: NOT DETECTED
DEPRECATED RDW RBC AUTO: 39.7 FL (ref 37–54)
EOSINOPHIL # BLD AUTO: 0.05 10*3/MM3 (ref 0–0.4)
EOSINOPHIL NFR BLD AUTO: 0.7 % (ref 0.3–6.2)
ERYTHROCYTE [DISTWIDTH] IN BLOOD BY AUTOMATED COUNT: 12.4 % (ref 12.3–15.4)
GLUCOSE UR STRIP-MCNC: NEGATIVE MG/DL
HBV SURFACE AG SERPL QL IA: NORMAL
HCT VFR BLD AUTO: 39.5 % (ref 34–46.6)
HCV AB SER DONR QL: NORMAL
HGB BLD-MCNC: 12.9 G/DL (ref 12–15.9)
HIV1+2 AB SER QL: NORMAL
IMM GRANULOCYTES # BLD AUTO: 0.03 10*3/MM3 (ref 0–0.05)
IMM GRANULOCYTES NFR BLD AUTO: 0.4 % (ref 0–0.5)
LYMPHOCYTES # BLD AUTO: 1.56 10*3/MM3 (ref 0.7–3.1)
LYMPHOCYTES NFR BLD AUTO: 20.8 % (ref 19.6–45.3)
MCH RBC QN AUTO: 28.4 PG (ref 26.6–33)
MCHC RBC AUTO-ENTMCNC: 32.7 G/DL (ref 31.5–35.7)
MCV RBC AUTO: 86.8 FL (ref 79–97)
MONOCYTES # BLD AUTO: 0.45 10*3/MM3 (ref 0.1–0.9)
MONOCYTES NFR BLD AUTO: 6 % (ref 5–12)
N GONORRHOEA RRNA SPEC QL NAA+PROBE: NOT DETECTED
NEUTROPHILS NFR BLD AUTO: 5.41 10*3/MM3 (ref 1.7–7)
NEUTROPHILS NFR BLD AUTO: 72 % (ref 42.7–76)
NRBC BLD AUTO-RTO: 0 /100 WBC (ref 0–0.2)
PLATELET # BLD AUTO: 220 10*3/MM3 (ref 140–450)
PMV BLD AUTO: 10.9 FL (ref 6–12)
PROT UR STRIP-MCNC: NEGATIVE MG/DL
RBC # BLD AUTO: 4.55 10*6/MM3 (ref 3.77–5.28)
RH BLD: POSITIVE
T PALLIDUM IGG SER QL: NORMAL
WBC NRBC COR # BLD: 7.51 10*3/MM3 (ref 3.4–10.8)

## 2022-12-20 PROCEDURE — 86762 RUBELLA ANTIBODY: CPT | Performed by: OBSTETRICS & GYNECOLOGY

## 2022-12-20 PROCEDURE — 85025 COMPLETE CBC W/AUTO DIFF WBC: CPT | Performed by: OBSTETRICS & GYNECOLOGY

## 2022-12-20 PROCEDURE — 86850 RBC ANTIBODY SCREEN: CPT | Performed by: OBSTETRICS & GYNECOLOGY

## 2022-12-20 PROCEDURE — 86780 TREPONEMA PALLIDUM: CPT | Performed by: OBSTETRICS & GYNECOLOGY

## 2022-12-20 PROCEDURE — 87591 N.GONORRHOEAE DNA AMP PROB: CPT | Performed by: OBSTETRICS & GYNECOLOGY

## 2022-12-20 PROCEDURE — 87086 URINE CULTURE/COLONY COUNT: CPT | Performed by: OBSTETRICS & GYNECOLOGY

## 2022-12-20 PROCEDURE — 87491 CHLMYD TRACH DNA AMP PROBE: CPT | Performed by: OBSTETRICS & GYNECOLOGY

## 2022-12-20 PROCEDURE — 86803 HEPATITIS C AB TEST: CPT | Performed by: OBSTETRICS & GYNECOLOGY

## 2022-12-20 PROCEDURE — 87340 HEPATITIS B SURFACE AG IA: CPT | Performed by: OBSTETRICS & GYNECOLOGY

## 2022-12-20 PROCEDURE — G0432 EIA HIV-1/HIV-2 SCREEN: HCPCS | Performed by: OBSTETRICS & GYNECOLOGY

## 2022-12-20 PROCEDURE — 0501F PRENATAL FLOW SHEET: CPT | Performed by: OBSTETRICS & GYNECOLOGY

## 2022-12-20 PROCEDURE — 86900 BLOOD TYPING SEROLOGIC ABO: CPT | Performed by: OBSTETRICS & GYNECOLOGY

## 2022-12-20 PROCEDURE — 86901 BLOOD TYPING SEROLOGIC RH(D): CPT | Performed by: OBSTETRICS & GYNECOLOGY

## 2022-12-20 RX ORDER — FAMOTIDINE 20 MG/1
TABLET, FILM COATED ORAL
COMMUNITY
Start: 2022-11-22 | End: 2022-12-20

## 2022-12-20 NOTE — PROGRESS NOTES
OB Initial Visit    CC- Here for care of current pregnancy       Subjective:  22 y.o.  presenting for her first obstetrical visit.    LMP: Patient's last menstrual period was 10/05/2022 (exact date).   Having some nausea and vomiting.  Overall symptoms are improving.    Reviewed and updated:  OBHx, GYNHx (STDs), PMHx, Medications, Allergies, PSHx, Social Hx, Preventative Hx (PAP), Hx of abuse/safe environment, Vaccine Hx including hx of chickenpox or vaccine, Genetic Hx (pt, FOB, both families).        Objective:  /75   Wt 58.1 kg (128 lb)   LMP 10/05/2022 (Exact Date)   BMI 21.97 kg/m²   General- NAD, alert and oriented, appropriate  Psych- Normal mood, good memory  Neck- No masses, no thyroid enlargement  CV- Regular rhythm, no murnurs  Resp- CTA to bases, no wheezes  Abdomen- Soft, non distended, non tender, no masses   External genitalia- Normal, no lesions  Urethra- Normal, no masses, non tender  Vagina- Normal, no discharge  Bladder- Normal, no masses, non tender  Cvx- Normal, no lesions, no discharge, no CMT  Uterus- Normal shape and consistency, non tender, Consistent with dates  Adnexa- Normal, no mass, non tender  Lymphatic- No palpable neck, axillary, or groin nodes  Ext- No edema, no cyanosis    Skin- No lesions, no rashes, no acanthosis nigricans      Assessment and Plan:  Diagnoses and all orders for this visit:    1. Supervision of other normal pregnancy, antepartum (Primary)  Overview:  EDC: 2023    Prenatal genetic screening: Declined    Tdap vaccine:  Flu vaccine: Recommended  COVID-19 vaccine: Recommended    Assessment & Plan:  Today's ultrasound reviewed.  EDC is consistent with previously established EDC.  Fetal heart rate normal.  No gross abnormalities.  Continue prenatal vitamins  Check prenatal labs  Schedule anatomy ultrasound for around 20 weeks of gestation  Discussed office visit schedule and call rotation  Reviewed medication safe in pregnancy  Declines prenatal  genetic screening  Reviewed nutrition, exercise, and appropriate weight gain in pregnancy    Orders:  -     POC Urinalysis Dipstick  -     Chlamydia trachomatis, Neisseria gonorrhoeae, PCR - Urine, Urine, Random Void  -     OB Panel With HIV; Future  -     Urine Culture - Urine, Urine, Clean Catch  -     US Ob Detail Fetal Anatomy Single or First Gestation; Future  -     OB Panel With HIV          10w6d    Genetic Screening: Counseled.  Declines all.     Vaccines: Recommend FLU vaccine this season, R/B discussed  Recommend COVID vaccine, R/B discussed    Counseling: Nutrition discussed, calories, activity/exercise in pregnancy  Discussed dietary restrictions/safety food preparation in pregnancy  Reviewed what to expect prenatal visits, office providers and [facility] hospitalists  Appropriate trimester precautions provided, N/V, vag bleeding, cramping  Questions answered    Return in about 4 weeks (around 1/17/2023).      Indio Soliz MD  12/20/2022

## 2022-12-20 NOTE — ASSESSMENT & PLAN NOTE
Today's ultrasound reviewed.  EDC is consistent with previously established EDC.  Fetal heart rate normal.  No gross abnormalities.  Continue prenatal vitamins  Check prenatal labs  Schedule anatomy ultrasound for around 20 weeks of gestation  Discussed office visit schedule and call rotation  Reviewed medication safe in pregnancy  Declines prenatal genetic screening  Reviewed nutrition, exercise, and appropriate weight gain in pregnancy

## 2022-12-21 ENCOUNTER — TELEPHONE (OUTPATIENT)
Dept: OBSTETRICS AND GYNECOLOGY | Facility: CLINIC | Age: 22
End: 2022-12-21

## 2022-12-21 LAB
BACTERIA SPEC AEROBE CULT: NO GROWTH
RUBV IGG SERPL IA-ACNC: 1.04 INDEX

## 2022-12-21 NOTE — TELEPHONE ENCOUNTER
Caller: SOLA BEJARANO    Relationship to patient: SELF    Best call back number: [phone]    Patient is needing: PATIENT IS 11 WEEKS PREGNANT AND WAS JUST DIAGNOSED WITH COVID. PLEASE ADVISE PATIENT ON RECOMMENDATIONS FOR MEDICATIONS THAT ARE SAFE TO TAKE DURING PREGNANCY.    PATIENT IS AVAILABLE FOR A CALL BACK AT ANYTIME AND IS FINE WITH A VOICEMAIL BEING LEFT IF SHE IS UNABLE TO ANSWER.

## 2023-01-18 ENCOUNTER — ROUTINE PRENATAL (OUTPATIENT)
Dept: OBSTETRICS AND GYNECOLOGY | Facility: CLINIC | Age: 23
End: 2023-01-18
Payer: COMMERCIAL

## 2023-01-18 VITALS — BODY MASS INDEX: 21.11 KG/M2 | DIASTOLIC BLOOD PRESSURE: 75 MMHG | SYSTOLIC BLOOD PRESSURE: 111 MMHG | WEIGHT: 123 LBS

## 2023-01-18 DIAGNOSIS — Z34.80 SUPERVISION OF OTHER NORMAL PREGNANCY, ANTEPARTUM: Primary | ICD-10-CM

## 2023-01-18 LAB
GLUCOSE UR STRIP-MCNC: NEGATIVE MG/DL
PROT UR STRIP-MCNC: NEGATIVE MG/DL

## 2023-01-18 PROCEDURE — 0502F SUBSEQUENT PRENATAL CARE: CPT | Performed by: OBSTETRICS & GYNECOLOGY

## 2023-01-18 NOTE — PROGRESS NOTES
OB FOLLOW UP    CC: Scheduled OB routine FU     Prenatal care complicated by:   Patient Active Problem List   Diagnosis   • Palpitations   • Syncope   • Cysts of both ovaries   • Supervision of other normal pregnancy, antepartum       Subjective:   Patient has: No complaints, No leaking fluid, No vaginal bleeding, No contractions  Having some acid reflux    Objective:  Urine glucose/protein- see flow sheet      /75   Wt 55.8 kg (123 lb)   LMP 10/05/2022 (Exact Date)   BMI 21.11 kg/m²   See OB flow for LE edema, and cvx exam if applicable  FHT: 146 BPM   Uterine Size: size equals dates      Assessment and Plan:  Diagnoses and all orders for this visit:    1. Supervision of other normal pregnancy, antepartum (Primary)  Overview:  EDC: 7/12/2023    Prenatal genetic screening: Declined    Tdap vaccine:  Flu vaccine: Recommended  COVID-19 vaccine: Recommended    Assessment & Plan:  Continue prenatal vitamins  Anatomy ultrasound next office visit  Reviewed prenatal labs    Orders:  -     POC Urinalysis Dipstick        15w0d  Reassuring pregnancy progress    Counseling: Second trimester precautions  OB precautions, leaking, VB, brandie ayala vs PTL/Labor    Questions answered    Return in about 4 weeks (around 2/15/2023).      Indio Soliz MD  01/18/2023

## 2023-02-03 ENCOUNTER — TELEPHONE (OUTPATIENT)
Dept: OBSTETRICS AND GYNECOLOGY | Facility: CLINIC | Age: 23
End: 2023-02-03
Payer: COMMERCIAL

## 2023-02-03 NOTE — TELEPHONE ENCOUNTER
Patient called in stating that for the past week she gets very dizzy feeling when she gets up. She has also started to have a headache, chest pain and SOA. She was advised that she needs to go to the hospital for evaluation.

## 2023-02-14 ENCOUNTER — ROUTINE PRENATAL (OUTPATIENT)
Dept: OBSTETRICS AND GYNECOLOGY | Facility: CLINIC | Age: 23
End: 2023-02-14
Payer: COMMERCIAL

## 2023-02-14 VITALS — DIASTOLIC BLOOD PRESSURE: 72 MMHG | SYSTOLIC BLOOD PRESSURE: 111 MMHG | WEIGHT: 128 LBS | BODY MASS INDEX: 21.97 KG/M2

## 2023-02-14 DIAGNOSIS — Z34.80 SUPERVISION OF OTHER NORMAL PREGNANCY, ANTEPARTUM: Primary | ICD-10-CM

## 2023-02-14 LAB
GLUCOSE UR STRIP-MCNC: NEGATIVE MG/DL
PROT UR STRIP-MCNC: NEGATIVE MG/DL

## 2023-02-14 PROCEDURE — 0502F SUBSEQUENT PRENATAL CARE: CPT | Performed by: OBSTETRICS & GYNECOLOGY

## 2023-02-14 NOTE — PROGRESS NOTES
OB FOLLOW UP    CC: Scheduled OB routine FU     Prenatal care complicated by:   Patient Active Problem List   Diagnosis   • Palpitations   • Syncope   • Cysts of both ovaries   • Supervision of other normal pregnancy, antepartum       Subjective:   Patient has: No complaints, No leaking fluid, No vaginal bleeding, No contractions, Adequate FM    Objective:  Urine glucose/protein- see flow sheet      /72   Wt 58.1 kg (128 lb)   LMP 10/05/2022 (Exact Date)   BMI 21.97 kg/m²   See OB flow for LE edema, and cvx exam if applicable  FHT: 144 BPM   Uterine Size: 19 cm      Assessment and Plan:  Diagnoses and all orders for this visit:    1. Supervision of other normal pregnancy, antepartum (Primary)  Overview:  EDC: 7/12/2023    Prenatal genetic screening: Declined    Tdap vaccine:  Flu vaccine: Recommended  COVID-19 vaccine: Recommended    Assessment & Plan:  Reviewed today's anatomy ultrasound.  Normal anatomy.  Placenta is 2.2 cm from the internal os, which is not low-lying.  Continue prenatal vitamins    Orders:  -     POC Urinalysis Dipstick        18w6d  Reassuring pregnancy progress    Counseling: OB precautions, leaking, VB, brandie ayala vs PTL/Labor  FKC    Questions answered    Return in about 4 weeks (around 3/14/2023) for Recheck.      Indio Soliz MD  02/14/2023

## 2023-02-14 NOTE — ASSESSMENT & PLAN NOTE
Reviewed today's anatomy ultrasound.  Normal anatomy.  Placenta is 2.2 cm from the internal os, which is not low-lying.  Continue prenatal vitamins

## 2023-03-15 ENCOUNTER — ROUTINE PRENATAL (OUTPATIENT)
Dept: OBSTETRICS AND GYNECOLOGY | Facility: CLINIC | Age: 23
End: 2023-03-15
Payer: COMMERCIAL

## 2023-03-15 VITALS — DIASTOLIC BLOOD PRESSURE: 71 MMHG | SYSTOLIC BLOOD PRESSURE: 103 MMHG | WEIGHT: 138 LBS | BODY MASS INDEX: 23.69 KG/M2

## 2023-03-15 DIAGNOSIS — Z34.80 SUPERVISION OF OTHER NORMAL PREGNANCY, ANTEPARTUM: Primary | ICD-10-CM

## 2023-03-15 LAB
GLUCOSE UR STRIP-MCNC: NEGATIVE MG/DL
PROT UR STRIP-MCNC: NEGATIVE MG/DL

## 2023-03-15 PROCEDURE — 0502F SUBSEQUENT PRENATAL CARE: CPT | Performed by: OBSTETRICS & GYNECOLOGY

## 2023-03-15 NOTE — ASSESSMENT & PLAN NOTE
Continue prenatal vitamins  Fetal kick counts   labor warnings  Follow-up anatomy scan next office visit  1 hour GTT next office visit

## 2023-03-15 NOTE — PROGRESS NOTES
OB FOLLOW UP    CC: Scheduled OB routine FU     Prenatal care complicated by:   Patient Active Problem List   Diagnosis   • Palpitations   • Syncope   • Cysts of both ovaries   • Supervision of other normal pregnancy, antepartum       Subjective:   Patient has: No complaints, No leaking fluid, No vaginal bleeding, No contractions, Adequate FM    Objective:  Urine glucose/protein- see flow sheet      /71   Wt 62.6 kg (138 lb)   LMP 10/05/2022 (Exact Date)   BMI 23.69 kg/m²   See OB flow for LE edema, and cvx exam if applicable  FHT: 153 BPM   Uterine Size: 23 cm      Assessment and Plan:  Diagnoses and all orders for this visit:    1. Supervision of other normal pregnancy, antepartum (Primary)  Overview:  EDC: 2023    Prenatal genetic screening: Declined    Tdap vaccine:  Flu vaccine: Recommended  COVID-19 vaccine: Recommended    Assessment & Plan:  Continue prenatal vitamins  Fetal kick counts   labor warnings  Follow-up anatomy scan next office visit  1 hour GTT next office visit    Orders:  -     POC Urinalysis Dipstick  -     US Ob 14 + Weeks Single or First Gestation; Future        23w0d  Reassuring pregnancy progress    Counseling: OB precautions, leaking, VB, brandie ayala vs PTL/Labor  FKC    Questions answered    Return in about 4 weeks (around 2023) for Recheck.      Indio Soliz MD  03/15/2023

## 2023-03-25 ENCOUNTER — HOSPITAL ENCOUNTER (OUTPATIENT)
Facility: HOSPITAL | Age: 23
Discharge: HOME | End: 2023-03-25
Attending: OBSTETRICS & GYNECOLOGY | Admitting: OBSTETRICS & GYNECOLOGY
Payer: COMMERCIAL

## 2023-03-25 VITALS
RESPIRATION RATE: 18 BRPM | DIASTOLIC BLOOD PRESSURE: 65 MMHG | TEMPERATURE: 98.3 F | SYSTOLIC BLOOD PRESSURE: 109 MMHG | HEART RATE: 106 BPM

## 2023-03-25 LAB
BACTERIA UR QL AUTO: ABNORMAL /HPF
BILIRUB UR QL STRIP: NEGATIVE
CLARITY UR: CLEAR
COLOR UR: ABNORMAL
GLUCOSE UR STRIP-MCNC: NEGATIVE MG/DL
HGB UR QL STRIP.AUTO: NEGATIVE
HYALINE CASTS UR QL AUTO: ABNORMAL /LPF
KETONES UR QL STRIP: ABNORMAL
LEUKOCYTE ESTERASE UR QL STRIP.AUTO: ABNORMAL
NITRITE UR QL STRIP: NEGATIVE
PH UR STRIP.AUTO: 6 [PH] (ref 5–8)
PROT UR QL STRIP: NEGATIVE
RBC # UR STRIP: ABNORMAL /HPF
REF LAB TEST METHOD: ABNORMAL
SP GR UR STRIP: 1.02 (ref 1–1.03)
SQUAMOUS #/AREA URNS HPF: ABNORMAL /HPF
UROBILINOGEN UR QL STRIP: ABNORMAL
WBC # UR STRIP: ABNORMAL /HPF

## 2023-03-25 PROCEDURE — G0463 HOSPITAL OUTPT CLINIC VISIT: HCPCS

## 2023-03-25 PROCEDURE — 81001 URINALYSIS AUTO W/SCOPE: CPT | Performed by: OBSTETRICS & GYNECOLOGY

## 2023-03-25 NOTE — SIGNIFICANT NOTE
Notified of pts arrival and complaints of back pain, u/a sent to lab   Pt also states that she was given antibiotics for UTI by her primary care provider

## 2023-03-25 NOTE — NON STRESS TEST
Obstetrical Non-stress Test Interpretation     Name:  Genesis Carver  MRN: [id number]    22 y.o. female  at 24w3d    Indication: 24 weeks with back pain      Fetal Assessment  Fetal Movement: active  Fetal HR Assessment Method: intermittent auscultation, using Doppler  Fetal HR (beats/min): 135  Fetal HR Baseline: normal range  Sinusoidal Pattern Present: absent  Fetal HR Tracing Category: other (see comments) (appropriate for gestational age)    /65 (BP Location: Right arm, Patient Position: Sitting)   Pulse 106   Temp 98.3 °F (36.8 °C) (Oral)   Resp 18   LMP 10/05/2022 (Exact Date)     Reason for test: OB Triage (back pain)  Date of Test: 3/25/2023  Time frame of test: 0178-7239  RN NST Interpretation:  (appropriate for gestational age)      Earle Saleh RN  3/25/2023  13:37 EDT

## 2023-03-25 NOTE — OBED NOTES
OBGYN Consult Note    Patient Name: Genesis Carver           : 2000        MRN: [id number]  Primary Care Physician:  Brooke Merrill APRN    Referring Physician: Bautista Brown MD  Date of admission: 3/25/2023      Subjective     Reason for Consult: Labor and delivery triage visit    HPI:  22 y.o.  24w3d who is seen in labor and delivery triage for evaluation of back pain with recent fever.  The patient is 24 weeks and 3 days gestation and was uncomplicated until a couple of days ago.  At that point she was noted to have some back pain and was evaluated for a possible urinary infection.  She was started on a prescription of Keflex 500 mg to take twice daily and had taken 1 tablet the day prior to admission.  Last evening she reported a fever of 101 and decided that she would not take her second dose of oral antibiotic.  Her concern over the fever and the persistent back pain prompted her visit to triage.  She denies hematuria or dysuria as well as symptoms of vaginal infection.  She has no recent respiratory symptoms of cough congestion sore throat or myalgias.  She reports no recent contagion exposure.  A clean-catch urine collected here is suggestive of possible urinary tract infection.  She does not describe pain typical of contractions or labor.  And she reports good fetal movement.    Review of Systems: No leaking fluid, No vaginal bleeding, No contractions, Adequate FM, No HA, No nausea, No vomiting, No diarrhea, and No abdominal pain      Personal History     Past OB History: She has a history of 1 prior first trimester pregnancy loss without resultant D&C    Prenatal Labs:    External Prenatal Results       Pregnancy Outside Results - Transcribed From Office Records - See Scanned Records For Details       Test Value Date Time    ABO  B  22 1058    Rh  Positive  22 1058    Antibody Screen  Negative  22 1058    Varicella IgG       Rubella  1.04 index 22 1058    Hgb  12.9  g/dL 12/20/22 1058    Hct  39.5 % 12/20/22 1058    Glucose Fasting GTT       Glucose Tolerance Test 1 hour       Glucose Tolerance Test 3 hour       Gonorrhea (discrete)  Not Detected  12/20/22 1052    Chlamydia (discrete)  Not Detected  12/20/22 1052    RPR  Non-Reactive  09/09/22 1414    VDRL       Syphilis Antibody       HBsAg  Non-Reactive  12/20/22 1058    Herpes Simplex Virus PCR       Herpes Simplex VIrus Culture       HIV  Non-Reactive  12/20/22 1058    Hep C RNA Quant PCR       Hep C Antibody  Non-Reactive  12/20/22 1058    AFP       Group B Strep       GBS Susceptibility to Clindamycin       GBS Susceptibility to Erythromycin       Fetal Fibronectin       Genetic Testing, Maternal Blood                 Drug Screening       Test Value Date Time    Urine Drug Screen       Amphetamine Screen       Barbiturate Screen       Benzodiazepine Screen       Methadone Screen       Phencyclidine Screen       Opiates Screen       THC Screen       Cocaine Screen       Propoxyphene Screen       Buprenorphine Screen       Methamphetamine Screen       Oxycodone Screen       Tricyclic Antidepressants Screen                 Legend    ^: Historical                            PMHx:    Past Medical History:   Diagnosis Date    Complete miscarriage 9/29/2022    Reflex tachycardia        Home Medications:  Prenatal MV-Min-Fe Fum-FA-DHA    Allergies:  No Known Allergies    PSHx:   No past surgical history on file.    Social History:    reports that she has never smoked. She has never used smokeless tobacco. She reports that she does not drink alcohol and does not use drugs.    Family History: Non contributory    Immunizations: See prenatal record for Tdap, Flu, Covid and/or other vaccinations      Objective     Vitals: reviewed    Physical Exam   General- NAD, alert and oriented, appropriate  Psych- Normal mood, good memory  CV- Regular rhythm, no murnurs  Resp- CTA to bases, no wheezes  Abdomen- NABS, soft, non distended, non  tender, no masses, there is no CVA tenderness or pain  Abdomen- Gravid, non tender  Fundus-  Size: consistent w dates.  Non tender.  Cvx-  cervical exam was not performed  /  no exam  / OOP/Ballotable  Presentation-  unable to detect presentation  Ext/DTRs- No edema  Fetal HR: [place]op[place] 110-160, see last OV note  Contractions: Not complaining of any contractions      Brief Urine Lab Results  (Last result in the past 365 days)        Color   Clarity   Blood   Leuk Est   Nitrite   Protein   CREAT   Urine HCG        03/25/23 1115 Dark Yellow   Clear   Negative   Trace   Negative   Negative                   [unfilled]    [unfilled]    CBC      CBC 9/9/22 9/14/22 12/20/22   WBC 7.51 8.50 7.51   RBC 4.86 4.67 4.55   Hemoglobin 13.6 13.2 12.9   Hematocrit 42.9 40.3 39.5   MCV 88.3 86.3 86.8   MCH 28.0 28.3 28.4   MCHC 31.7 32.8 32.7   RDW 12.1 (A) 12.6 12.4   Platelets 222 245 220   (A) Abnormal value                CMP      CMP 9/14/22   Glucose 82   BUN 13   Creatinine 0.65   eGFR 129.4   Sodium 140   Potassium 4.0   Chloride 104   Calcium 9.1   Total Protein 7.2   Albumin 4.90   Globulin 2.3   Total Bilirubin 0.2   Alkaline Phosphatase 77   AST (SGOT) 20   ALT (SGPT) 20   Albumin/Globulin Ratio 2.1   BUN/Creatinine Ratio 20.0   Anion Gap 11.7        Comments are available for some flowsheets but are not being displayed.                 Assessment / Plan       Assessment:  Possible urinary tract infection versus myalgia with reported fever but afebrile on presentation here.  As empiric treatment for UTI has been started but only 1 dose taken it is appropriate at this point to continue the currently prescribed therapy pending result of a urine culture which she states was collected on her recent visit to the office.  I explained that the isolated fever may be a symptom of some other contagion either a viral or bacterial exposure.  It is also possible that this pain may represent some typical myalgia from activity and  pregnancy and be benign in nature.    Plan:   Continue empiric prescribed Keflex 500 mg twice daily force oral fluids and monitor for recurrence of fever as well as urinary tract symptoms or other symptoms consistent with respiratory or viral infection.  She is instructed to report to her family physician on Monday for results of her recent urine culture and sensitivity.      Electronically signed by Bautista Brown MD, 03/25/23, 12:42 PM EDT.

## 2023-04-13 ENCOUNTER — ROUTINE PRENATAL (OUTPATIENT)
Dept: OBSTETRICS AND GYNECOLOGY | Facility: CLINIC | Age: 23
End: 2023-04-13
Payer: COMMERCIAL

## 2023-04-13 VITALS — BODY MASS INDEX: 24.03 KG/M2 | DIASTOLIC BLOOD PRESSURE: 70 MMHG | SYSTOLIC BLOOD PRESSURE: 109 MMHG | WEIGHT: 140 LBS

## 2023-04-13 DIAGNOSIS — Z34.80 SUPERVISION OF OTHER NORMAL PREGNANCY, ANTEPARTUM: Primary | ICD-10-CM

## 2023-04-13 LAB
DEPRECATED RDW RBC AUTO: 37.3 FL (ref 37–54)
ERYTHROCYTE [DISTWIDTH] IN BLOOD BY AUTOMATED COUNT: 12.4 % (ref 12.3–15.4)
GLUCOSE 1H P GLC SERPL-MCNC: 91 MG/DL (ref 65–139)
GLUCOSE UR STRIP-MCNC: NEGATIVE MG/DL
HCT VFR BLD AUTO: 31.9 % (ref 34–46.6)
HGB BLD-MCNC: 10.8 G/DL (ref 12–15.9)
MCH RBC QN AUTO: 28.6 PG (ref 26.6–33)
MCHC RBC AUTO-ENTMCNC: 33.9 G/DL (ref 31.5–35.7)
MCV RBC AUTO: 84.6 FL (ref 79–97)
PLATELET # BLD AUTO: 239 10*3/MM3 (ref 140–450)
PMV BLD AUTO: 9.8 FL (ref 6–12)
PROT UR STRIP-MCNC: NEGATIVE MG/DL
RBC # BLD AUTO: 3.77 10*6/MM3 (ref 3.77–5.28)
WBC NRBC COR # BLD: 8.55 10*3/MM3 (ref 3.4–10.8)

## 2023-04-13 PROCEDURE — 81002 URINALYSIS NONAUTO W/O SCOPE: CPT | Performed by: OBSTETRICS & GYNECOLOGY

## 2023-04-13 PROCEDURE — 82950 GLUCOSE TEST: CPT | Performed by: OBSTETRICS & GYNECOLOGY

## 2023-04-13 PROCEDURE — 85027 COMPLETE CBC AUTOMATED: CPT | Performed by: OBSTETRICS & GYNECOLOGY

## 2023-04-13 PROCEDURE — 0502F SUBSEQUENT PRENATAL CARE: CPT | Performed by: OBSTETRICS & GYNECOLOGY

## 2023-04-13 NOTE — PROGRESS NOTES
OB FOLLOW UP  CC- Here for care of pregnancy        Genesis Carver is a 22 y.o.  27w1d patient being seen today for her obstetrical follow up visit. Patient reports no complaints.     Her prenatal care is complicated by (and status) :    Patient Active Problem List   Diagnosis   • Palpitations   • Syncope   • Cysts of both ovaries   • Supervision of other normal pregnancy, antepartum       Flu Status: Declines  Covid Status:    ROS -   Patient Reports : No Problems  Patient Denies: Loss of Fluid and Vaginal Spotting  Fetal Movement : normal  All other systems reviewed and are negative.       The additional following portions of the patient's history were reviewed and updated as appropriate: allergies, current medications, past family history, past medical history, past social history, past surgical history and problem list.    I have reviewed and agree with the HPI, ROS, and historical information as entered above. Bianca Echols, DO    /70   Wt 63.5 kg (140 lb)   LMP 10/05/2022 (Exact Date)   BMI 24.03 kg/m²       EXAM:     FHT: 140 BPM   Uterine Size: size equals dates  Pelvic Exam: No    Urine glucose/protein: See prenatal flowsheet       Assessment and Plan  Diagnoses and all orders for this visit:    1. Supervision of other normal pregnancy, antepartum (Primary)  Overview:  EDC: 2023  F/U sono: 23: EFW 1136 g (2lb 8oz) 66th%.  ORVILLE 13.7 cm, vertex, post placenta 2.6 cm from int os  Prenatal genetic screening: Declined    Tdap vaccine:  Flu vaccine: Recommended  COVID-19 vaccine: Recommended    Orders:  -     POC Urinalysis Dipstick  -     CBC (No Diff); Future  -     Gestational Diabetes Screen 1 Hour; Future  -     CBC (No Diff)  -     Gestational Diabetes Screen 1 Hour       1. Pregnancy at 27w1d  2. Fetal status reassuring.   3. Activity and Exercise discussed.  Return in about 3 weeks (around 2023) for dr segundo.   1 hr gtt, cbc today  Kick counts bid    Bianca Echols,  DO  04/13/2023

## 2023-05-08 ENCOUNTER — ROUTINE PRENATAL (OUTPATIENT)
Dept: OBSTETRICS AND GYNECOLOGY | Facility: CLINIC | Age: 23
End: 2023-05-08
Payer: COMMERCIAL

## 2023-05-08 VITALS — BODY MASS INDEX: 25.4 KG/M2 | WEIGHT: 148 LBS | SYSTOLIC BLOOD PRESSURE: 118 MMHG | DIASTOLIC BLOOD PRESSURE: 74 MMHG

## 2023-05-08 DIAGNOSIS — O99.019 MATERNAL ANEMIA IN PREGNANCY, ANTEPARTUM: ICD-10-CM

## 2023-05-08 DIAGNOSIS — Z34.80 SUPERVISION OF OTHER NORMAL PREGNANCY, ANTEPARTUM: Primary | ICD-10-CM

## 2023-05-08 LAB
GLUCOSE UR STRIP-MCNC: NEGATIVE MG/DL
PROT UR STRIP-MCNC: NEGATIVE MG/DL

## 2023-05-08 RX ORDER — FERROUS SULFATE 325(65) MG
325 TABLET ORAL EVERY OTHER DAY
Qty: 30 TABLET | Refills: 3 | Status: SHIPPED | OUTPATIENT
Start: 2023-05-08

## 2023-05-08 NOTE — PROGRESS NOTES
OB FOLLOW UP    CC: Scheduled OB routine FU     Prenatal care complicated by:   Patient Active Problem List   Diagnosis   • Palpitations   • Syncope   • Cysts of both ovaries   • Supervision of other normal pregnancy, antepartum   • Maternal anemia in pregnancy, antepartum       Subjective:   Patient has: No complaints, No leaking fluid, No vaginal bleeding, No contractions, Adequate FM    Objective:  Urine glucose/protein- see flow sheet      /74   Wt 67.1 kg (148 lb)   LMP 10/05/2022 (Exact Date)   BMI 25.40 kg/m²   See OB flow for LE edema, and cvx exam if applicable  FHT: 151 BPM   Uterine Size: 30 cm      Assessment and Plan:  Diagnoses and all orders for this visit:    1. Supervision of other normal pregnancy, antepartum (Primary)  Overview:  EDC: 2023  F/U sono: 23: EFW 1136 g (2lb 8oz) 66th%.  ORVILLE 13.7 cm, vertex, post placenta 2.6 cm from int os  Prenatal genetic screening: Declined    Tdap vaccine:  Flu vaccine: Recommended  COVID-19 vaccine: Recommended    Assessment & Plan:  Continue prenatal vitamins  Fetal kick counts   labor warnings    Orders:  -     POC Urinalysis Dipstick    2. Maternal anemia in pregnancy, antepartum  Assessment & Plan:  Ferrous sulfate 325 mg every other day    Orders:  -     ferrous sulfate 325 (65 FE) MG tablet; Take 1 tablet by mouth Every Other Day.  Dispense: 30 tablet; Refill: 3        30w5d  Reassuring pregnancy progress    Counseling: OB precautions, leaking, VB, brandie ayala vs PTL/Labor  FKC    Questions answered    Return in about 2 weeks (around 2023) for Recheck.      Indio Soliz MD  2023

## 2023-05-24 ENCOUNTER — ROUTINE PRENATAL (OUTPATIENT)
Dept: OBSTETRICS AND GYNECOLOGY | Facility: CLINIC | Age: 23
End: 2023-05-24
Payer: COMMERCIAL

## 2023-05-24 VITALS — WEIGHT: 150 LBS | DIASTOLIC BLOOD PRESSURE: 77 MMHG | BODY MASS INDEX: 25.75 KG/M2 | SYSTOLIC BLOOD PRESSURE: 118 MMHG

## 2023-05-24 DIAGNOSIS — Z34.80 SUPERVISION OF OTHER NORMAL PREGNANCY, ANTEPARTUM: Primary | ICD-10-CM

## 2023-05-24 DIAGNOSIS — O99.019 MATERNAL ANEMIA IN PREGNANCY, ANTEPARTUM: ICD-10-CM

## 2023-05-24 PROBLEM — G44.209 TENSION TYPE HEADACHE: Status: ACTIVE | Noted: 2023-05-24

## 2023-05-24 PROCEDURE — 0502F SUBSEQUENT PRENATAL CARE: CPT | Performed by: OBSTETRICS & GYNECOLOGY

## 2023-05-24 NOTE — PROGRESS NOTES
OB FOLLOW UP    CC: Scheduled OB routine FU     Prenatal care complicated by:   Patient Active Problem List   Diagnosis   • Palpitations   • Syncope   • Cysts of both ovaries   • Supervision of other normal pregnancy, antepartum   • Maternal anemia in pregnancy, antepartum   • Tension type headache       Subjective:   Patient has: No complaints, No leaking fluid, No vaginal bleeding, No contractions, Adequate FM  Reports having a tension type headache for several days.  She reports Tylenol not helping.  She has no other symptoms or concerns.    Objective:  Urine glucose/protein- see flow sheet      /77   Wt 68 kg (150 lb)   LMP 10/05/2022 (Exact Date)   BMI 25.75 kg/m²   See OB flow for LE edema, and cvx exam if applicable  FHT: 143 BPM   Uterine Size: 32 cm      Assessment and Plan:  Diagnoses and all orders for this visit:    1. Supervision of other normal pregnancy, antepartum (Primary)  Overview:  EDC: 2023  F/U sono: 23: EFW 1136 g (2lb 8oz) 66th%.  ORVILLE 13.7 cm, vertex, post placenta 2.6 cm from int os  Prenatal genetic screening: Declined    Tdap vaccine:  Flu vaccine: Recommended  COVID-19 vaccine: Recommended    Assessment & Plan:  Continue prenatal vitamins  Fetal kick counts   labor warnings    Orders:  -     POC Urinalysis Dipstick  -     US Ob 14 + Weeks Single or First Gestation; Future    2. Maternal anemia in pregnancy, antepartum  Assessment & Plan:  Continue ferrous sulfate          33w0d  Reassuring pregnancy progress    Counseling: OB precautions, leaking, VB, brandie ayala vs PTL/Labor  FKC    Questions answered    Return in about 2 weeks (around 2023) for Recheck.      Indio Soliz MD  2023

## 2023-05-24 NOTE — ASSESSMENT & PLAN NOTE
Recommended to increase oral hydration.  Discussed to try Excedrin tension to see if this will help relieve her headache.  She should avoid ibuprofen, Advil and Aleve or naproxen.  Recommended treating any allergy symptoms if she has them.  She can utilize Benadryl Zyrtec or Claritin.  If these measures do not help she is to notify me for other options.  Given the patient's blood pressure is normal, I am not concerned about preeclampsia.

## 2023-06-05 ENCOUNTER — HOSPITAL ENCOUNTER (OUTPATIENT)
Facility: HOSPITAL | Age: 23
Discharge: HOME | End: 2023-06-05
Attending: OBSTETRICS & GYNECOLOGY | Admitting: OBSTETRICS & GYNECOLOGY
Payer: COMMERCIAL

## 2023-06-05 VITALS
HEART RATE: 96 BPM | SYSTOLIC BLOOD PRESSURE: 116 MMHG | TEMPERATURE: 99 F | RESPIRATION RATE: 18 BRPM | OXYGEN SATURATION: 99 % | DIASTOLIC BLOOD PRESSURE: 77 MMHG

## 2023-06-05 LAB
ALBUMIN SERPL-MCNC: 3.4 G/DL (ref 3.5–5.2)
ALBUMIN/GLOB SERPL: 1.4 G/DL
ALP SERPL-CCNC: 204 U/L (ref 39–117)
ALT SERPL W P-5'-P-CCNC: 7 U/L (ref 1–33)
ANION GAP SERPL CALCULATED.3IONS-SCNC: 11.3 MMOL/L (ref 5–15)
AST SERPL-CCNC: 14 U/L (ref 1–32)
BASOPHILS # BLD AUTO: 0.02 10*3/MM3 (ref 0–0.2)
BASOPHILS NFR BLD AUTO: 0.1 % (ref 0–1.5)
BILIRUB BLD-MCNC: NEGATIVE MG/DL
BILIRUB SERPL-MCNC: <0.2 MG/DL (ref 0–1.2)
BUN SERPL-MCNC: 4 MG/DL (ref 6–20)
BUN/CREAT SERPL: 8 (ref 7–25)
CALCIUM SPEC-SCNC: 8.8 MG/DL (ref 8.6–10.5)
CHLORIDE SERPL-SCNC: 107 MMOL/L (ref 98–107)
CLARITY, POC: CLEAR
CO2 SERPL-SCNC: 20.7 MMOL/L (ref 22–29)
COLOR UR: YELLOW
CREAT SERPL-MCNC: 0.5 MG/DL (ref 0.57–1)
CREAT UR-MCNC: 38.1 MG/DL
DEPRECATED RDW RBC AUTO: 42.1 FL (ref 37–54)
EGFRCR SERPLBLD CKD-EPI 2021: 137.9 ML/MIN/1.73
EOSINOPHIL # BLD AUTO: 0.04 10*3/MM3 (ref 0–0.4)
EOSINOPHIL NFR BLD AUTO: 0.3 % (ref 0.3–6.2)
ERYTHROCYTE [DISTWIDTH] IN BLOOD BY AUTOMATED COUNT: 14.3 % (ref 12.3–15.4)
GLOBULIN UR ELPH-MCNC: 2.5 GM/DL
GLUCOSE SERPL-MCNC: 95 MG/DL (ref 65–99)
GLUCOSE UR STRIP-MCNC: NEGATIVE MG/DL
HCT VFR BLD AUTO: 31.4 % (ref 34–46.6)
HGB BLD-MCNC: 10.9 G/DL (ref 12–15.9)
IMM GRANULOCYTES # BLD AUTO: 0.08 10*3/MM3 (ref 0–0.05)
IMM GRANULOCYTES NFR BLD AUTO: 0.6 % (ref 0–0.5)
KETONES UR QL: NEGATIVE
LEUKOCYTE EST, POC: ABNORMAL
LYMPHOCYTES # BLD AUTO: 2.2 10*3/MM3 (ref 0.7–3.1)
LYMPHOCYTES NFR BLD AUTO: 16.3 % (ref 19.6–45.3)
MCH RBC QN AUTO: 28.5 PG (ref 26.6–33)
MCHC RBC AUTO-ENTMCNC: 34.7 G/DL (ref 31.5–35.7)
MCV RBC AUTO: 82.2 FL (ref 79–97)
MONOCYTES # BLD AUTO: 1.14 10*3/MM3 (ref 0.1–0.9)
MONOCYTES NFR BLD AUTO: 8.5 % (ref 5–12)
NEUTROPHILS NFR BLD AUTO: 10 10*3/MM3 (ref 1.7–7)
NEUTROPHILS NFR BLD AUTO: 74.2 % (ref 42.7–76)
NITRITE UR-MCNC: NEGATIVE MG/ML
NRBC BLD AUTO-RTO: 0 /100 WBC (ref 0–0.2)
PH UR: 5.5 [PH] (ref 5–8)
PLATELET # BLD AUTO: 214 10*3/MM3 (ref 140–450)
PMV BLD AUTO: 10.9 FL (ref 6–12)
POTASSIUM SERPL-SCNC: 3 MMOL/L (ref 3.5–5.2)
PROT ?TM UR-MCNC: 5.7 MG/DL
PROT SERPL-MCNC: 5.9 G/DL (ref 6–8.5)
PROT UR STRIP-MCNC: NEGATIVE MG/DL
PROT/CREAT UR: 0.15 MG/G{CREAT}
RBC # BLD AUTO: 3.82 10*6/MM3 (ref 3.77–5.28)
RBC # UR STRIP: NEGATIVE /UL
SODIUM SERPL-SCNC: 139 MMOL/L (ref 136–145)
SP GR UR: 1.01 (ref 1–1.03)
UROBILINOGEN UR QL: ABNORMAL
WBC NRBC COR # BLD: 13.48 10*3/MM3 (ref 3.4–10.8)

## 2023-06-05 PROCEDURE — 82570 ASSAY OF URINE CREATININE: CPT | Performed by: OBSTETRICS & GYNECOLOGY

## 2023-06-05 PROCEDURE — 80053 COMPREHEN METABOLIC PANEL: CPT | Performed by: OBSTETRICS & GYNECOLOGY

## 2023-06-05 PROCEDURE — 0 POTASSIUM CHLORIDE 10 MEQ/100ML SOLUTION: Performed by: OBSTETRICS & GYNECOLOGY

## 2023-06-05 PROCEDURE — 85025 COMPLETE CBC W/AUTO DIFF WBC: CPT | Performed by: OBSTETRICS & GYNECOLOGY

## 2023-06-05 PROCEDURE — 84156 ASSAY OF PROTEIN URINE: CPT | Performed by: OBSTETRICS & GYNECOLOGY

## 2023-06-05 PROCEDURE — 81002 URINALYSIS NONAUTO W/O SCOPE: CPT | Performed by: OBSTETRICS & GYNECOLOGY

## 2023-06-05 PROCEDURE — G0463 HOSPITAL OUTPT CLINIC VISIT: HCPCS

## 2023-06-05 PROCEDURE — 59025 FETAL NON-STRESS TEST: CPT

## 2023-06-05 RX ORDER — POTASSIUM CHLORIDE 7.45 MG/ML
10 INJECTION INTRAVENOUS ONCE
Status: COMPLETED | OUTPATIENT
Start: 2023-06-05 | End: 2023-06-05

## 2023-06-05 RX ADMIN — POTASSIUM CHLORIDE 10 MEQ: 7.46 INJECTION, SOLUTION INTRAVENOUS at 21:48

## 2023-06-05 NOTE — NURSING NOTE
34w5d presents to L&D with reports of contractions since yesterday that have progressively gotten worse throughout the day. States she went to [facility] last night for contractions but they said cervix was closed and discharged her home. Two patient id verified, efm and uc toco applied, abdomen palpates soft and non tender, fetal movement noted. Patient reports feeling contractions, abdomen palpates soft. Denies vaginal bleeding or leaking of fluid.

## 2023-06-06 NOTE — NON STRESS TEST
Obstetrical Non-stress Test Interpretation     Name:  Genesis Carver  MRN: [id number]    22 y.o. female  at 34w5d    Indication: OB Triage--Contractions      Fetal Assessment  Fetal Movement: active  Fetal HR Assessment Method: external  Fetal HR (beats/min): 130  Fetal HR Baseline: normal range  Fetal HR Variability: moderate (amplitude range 6 to 25 bpm)  Fetal HR Accelerations: episodic, greater than/equal to 15 bpm, lasting at least 15 seconds  Fetal HR Decelerations: absent  Sinusoidal Pattern Present: absent    /77   Pulse 96   Temp 99 °F (37.2 °C) (Oral)   Resp 18   LMP 10/05/2022 (Exact Date)   SpO2 99%     Reason for test: OB Triage  Date of Test: 2023  Time frame of test:     RN NST Interpretation: Reactive      Angie Ross RN  2023  23:11 EDT

## 2023-06-07 ENCOUNTER — ROUTINE PRENATAL (OUTPATIENT)
Dept: OBSTETRICS AND GYNECOLOGY | Facility: CLINIC | Age: 23
End: 2023-06-07
Payer: COMMERCIAL

## 2023-06-07 VITALS — DIASTOLIC BLOOD PRESSURE: 80 MMHG | WEIGHT: 152.8 LBS | SYSTOLIC BLOOD PRESSURE: 122 MMHG | BODY MASS INDEX: 26.23 KG/M2

## 2023-06-07 DIAGNOSIS — O99.019 MATERNAL ANEMIA IN PREGNANCY, ANTEPARTUM: ICD-10-CM

## 2023-06-07 DIAGNOSIS — Z34.80 SUPERVISION OF OTHER NORMAL PREGNANCY, ANTEPARTUM: Primary | ICD-10-CM

## 2023-06-07 LAB
GLUCOSE UR STRIP-MCNC: NEGATIVE MG/DL
PROT UR STRIP-MCNC: NEGATIVE MG/DL

## 2023-06-07 NOTE — OBED NOTES
AKIL Funk  Obstetric History and Physical    Chief Complaint   Patient presents with    Contractions     Starting yesterday and worsening today.        Subjective     Patient is a 22 y.o. female  currently at 35w0d, who presents with complaint of contractions.  No vaginal bleeding.  Positive fetal movements.  No nausea or vomiting.  No headache visual disturbance or right upper quadrant pain.    Her prenatal care is benign.  Her previous obstetric/gynecological history is noted for is non-contributory.    The following portions of the patients history were reviewed and updated as appropriate: current medications, allergies, past medical history, past surgical history, past family history, and past social history .       Prenatal Information:  Prenatal Results       Initial Prenatal Labs       Test Value Reference Range Date Time    Hemoglobin  12.9 g/dL 12.0 - 15.9 22 1058    Hematocrit  39.5 % 34.0 - 46.6 22 1058    Platelets  220 10*3/mm3 140 - 450 22 1058    Rubella IgG  1.04 index Immune >0.99 22 1058    Hepatitis B SAg  Non-Reactive  Non-Reactive 22 1058    Hepatitis C Ab  Non-Reactive  Non-Reactive 22 1058    RPR  Non-Reactive  Non-Reactive 22 1414    T. Pallidum Ab   Non-Reactive  Non-Reactive 22 1058    ABO  B   22 1058    Rh  Positive   22 1058    Antibody Screen  Negative   22 1058    HIV  Non-Reactive  Non-Reactive 22 1058    Urine Culture  No growth   22 1052    Gonorrhea  Not Detected  Not Detected  22 1052    Chlamydia  Not Detected  Not Detected  22 1052    TSH        HgB A1c         Varicella IgG        HgB Electrophoresis         Cystic fibrosis                   Fetal testing        Test Value Reference Range Date Time    NIPT        MSAFP        AFP-4                  2nd and 3rd Trimester       Test Value Reference Range Date Time    Hemoglobin (repeated)  10.9 g/dL 12.0 - 15.9 23        10.8 g/dL 12.0 - 15.9 04/13/23 1124    Hematocrit (repeated)  31.4 % 34.0 - 46.6 06/05/23 2017       31.9 % 34.0 - 46.6 04/13/23 1124    Platelets   214 10*3/mm3 140 - 450 06/05/23 2017       239 10*3/mm3 140 - 450 04/13/23 1124       220 10*3/mm3 140 - 450 12/20/22 1058    GCT  91 mg/dL 65 - 139 04/13/23 1124    Antibody Screen (repeated)  Negative   12/20/22 1058    GTT Fasting        GTT 1 Hr        GTT 2 Hr        GTT 3 Hr        Group B Strep                  Other testing        Test Value Reference Range Date Time    [place] IgG         CMV IgG                   Drug Screening       Test Value Reference Range Date Time    Amphetamine Screen        Barbiturate Screen        Benzodiazepine Screen        Methadone Screen        Phencyclidine Screen        Opiates Screen        THC Screen        Cocaine Screen        Propoxyphene Screen        Buprenorphine Screen        Methamphetamine Screen        Oxycodone Screen        Tricyclic Antidepressants Screen                  Legend    ^: Historical                          External Prenatal Results       Pregnancy Outside Results - Transcribed From Office Records - See Scanned Records For Details       Test Value Date Time    ABO  B  12/20/22 1058    Rh  Positive  12/20/22 1058    Antibody Screen  Negative  12/20/22 1058    Varicella IgG       Rubella  1.04 index 12/20/22 1058    Hgb  10.9 g/dL 06/05/23 2017       10.8 g/dL 04/13/23 1124       12.9 g/dL 12/20/22 1058    Hct  31.4 % 06/05/23 2017       31.9 % 04/13/23 1124       39.5 % 12/20/22 1058    Glucose Fasting GTT       Glucose Tolerance Test 1 hour       Glucose Tolerance Test 3 hour       Gonorrhea (discrete)  Not Detected  12/20/22 1052    Chlamydia (discrete)  Not Detected  12/20/22 1052    RPR  Non-Reactive  09/09/22 1414    VDRL       Syphilis Antibody       HBsAg  Non-Reactive  12/20/22 1058    Herpes Simplex Virus PCR       Herpes Simplex VIrus Culture       HIV  Non-Reactive  12/20/22 1058    Hep C  RNA Quant PCR       Hep C Antibody  Non-Reactive  22 1058    AFP       Group B Strep       GBS Susceptibility to Clindamycin       GBS Susceptibility to Erythromycin       Fetal Fibronectin       Genetic Testing, Maternal Blood                 Drug Screening       Test Value Date Time    Urine Drug Screen       Amphetamine Screen       Barbiturate Screen       Benzodiazepine Screen       Methadone Screen       Phencyclidine Screen       Opiates Screen       THC Screen       Cocaine Screen       Propoxyphene Screen       Buprenorphine Screen       Methamphetamine Screen       Oxycodone Screen       Tricyclic Antidepressants Screen                 Legend    ^: Historical                             Past OB History:     OB History    Para Term  AB Living   2 0 0 0 1 0   SAB IAB Ectopic Molar Multiple Live Births   1 0 0 0 0 0      # Outcome Date GA Lbr Govind/2nd Weight Sex Delivery Anes PTL Lv   2 Current            1 SAB                Past Medical History: Past Medical History:   Diagnosis Date    Complete miscarriage 2022    Reflex tachycardia       Past Surgical History History reviewed. No pertinent surgical history.   Family History: Family History   Problem Relation Age of Onset    No Known Problems Father     Breast cancer Neg Hx     Ovarian cancer Neg Hx     Uterine cancer Neg Hx     Colon cancer Neg Hx     Melanoma Neg Hx     Prostate cancer Neg Hx       Social History:  reports that she has never smoked. She has never used smokeless tobacco.   reports no history of alcohol use.   reports no history of drug use.        General ROS: Pertinent items are noted in HPI    Objective       Vital Signs Range for the last 24 hours  Temperature:     Temp Source:     BP: BP: (122)/(80) 122/80   Pulse:     Respirations:     SPO2:     O2 Amount (l/min):     O2 Devices     Weight: Weight:  [69.3 kg (152 lb 12.8 oz)] 69.3 kg (152 lb 12.8 oz)     Physical Examination: General appearance - alert, well  appearing, and in no distress  Mental status - alert, oriented to person, place, and time  Chest - clear to auscultation, no wheezes, rales or rhonchi, symmetric air entry  Heart - normal rate, regular rhythm, normal S1, S2, no murmurs, rubs, clicks or gallops  Abdomen - soft, nontender, gravid  Extremities -trace edema    Presentation: Unknown   Cervix: Exam by: Method: sterile exam per RN   Dilation: Cervical Dilation (cm): 0   Effacement: Cervical Effacement: 0%   Station:         Fetal Heart Rate Assessment   Method: Fetal HR Assessment Method: external   Beats/min: Fetal HR (beats/min): 130   Baseline: Fetal HR Baseline: normal range   Variability: Fetal HR Variability: moderate (amplitude range 6 to 25 bpm)   Accels: Fetal HR Accelerations: episodic, greater than/equal to 15 bpm, lasting at least 15 seconds   Decels: Fetal HR Decelerations: absent         Uterine Assessment   Method: Method: palpation, external tocotransducer   Frequency (min): Contraction Frequency (Minutes): Irregular   Ctx Count in 10 min:     Duration:     Intensity: Contraction Intensity: mild by palpation       [place] Units:       GBS is unknown      Assessment & Plan     False labor at 35 weeks estimate gestational age  Hypokalemia      Assessment:  1.  Intrauterine pregnancy at 35w0d gestation with reactive fetal status.    2.  IUP at 35 weeks estimate gestational age with false labor.  She had 1 elevated blood pressure and PIH labs were done which were normal except for low potassium level.  Patient received 1 IV potassium run.    Plan:  Discharge home  PIH precautions  Return labor precautions  Strict fetal kick count    Henrik Villalobos MD  6/7/2023  15:19 EDT

## 2023-06-07 NOTE — ASSESSMENT & PLAN NOTE
Continue prenatal vitamins  Fetal kick counts   labor warnings  GBS next office visit  Discussed the difference between  labor and  contractions

## 2023-06-07 NOTE — PROGRESS NOTES
OB FOLLOW UP    CC: Scheduled OB routine FU     Prenatal care complicated by:   Patient Active Problem List   Diagnosis    Palpitations    Syncope    Cysts of both ovaries    Supervision of other normal pregnancy, antepartum    Maternal anemia in pregnancy, antepartum    Tension type headache       Subjective:   Patient has: No leaking fluid, No vaginal bleeding, Adequate FM  Reports having significant contractions the last 2 nights.  Was seen at [facility] and [facility].  She was having contractions but was not dilated.    Objective:  Urine glucose/protein- see flow sheet      /80   Wt 69.3 kg (152 lb 12.8 oz)   LMP 10/05/2022 (Exact Date)   BMI 26.23 kg/m²   See OB flow for LE edema, and cvx exam if applicable  FHT: 131 BPM   Uterine Size:  34 cm      Assessment and Plan:  Diagnoses and all orders for this visit:    1. Supervision of other normal pregnancy, antepartum (Primary)  Overview:  EDC: 2023  F/U sono: 23: EFW 1136 g (2lb 8oz) 66th%.  ORVILLE 13.7 cm, vertex, post placenta 2.6 cm from int os  Prenatal genetic screening: Declined    Tdap vaccine:  Flu vaccine: Recommended  COVID-19 vaccine: Recommended    Assessment & Plan:  Continue prenatal vitamins  Fetal kick counts   labor warnings  GBS next office visit  Discussed the difference between  labor and  contractions    Orders:  -     POC Urinalysis Dipstick  -     Cancel: Group B Streptococcus Culture - Swab, Vaginal/Rectum; Future    2. Maternal anemia in pregnancy, antepartum  Assessment & Plan:  Continue ferrous sulfate            35w0d  Reassuring pregnancy progress    Counseling: OB precautions, leaking, VB, brandie ayala vs PTL/Labor  FKC    Questions answered    Return in about 1 week (around 2023) for Recheck.      Indio Soliz MD  2023

## 2023-06-14 ENCOUNTER — ROUTINE PRENATAL (OUTPATIENT)
Dept: OBSTETRICS AND GYNECOLOGY | Facility: CLINIC | Age: 23
End: 2023-06-14
Payer: COMMERCIAL

## 2023-06-14 VITALS — BODY MASS INDEX: 25.44 KG/M2 | SYSTOLIC BLOOD PRESSURE: 131 MMHG | DIASTOLIC BLOOD PRESSURE: 86 MMHG | WEIGHT: 148.2 LBS

## 2023-06-14 DIAGNOSIS — O99.019 MATERNAL ANEMIA IN PREGNANCY, ANTEPARTUM: ICD-10-CM

## 2023-06-14 DIAGNOSIS — Z34.80 SUPERVISION OF OTHER NORMAL PREGNANCY, ANTEPARTUM: Primary | ICD-10-CM

## 2023-06-14 LAB
GLUCOSE UR STRIP-MCNC: NEGATIVE MG/DL
PROT UR STRIP-MCNC: NEGATIVE MG/DL

## 2023-06-14 PROCEDURE — 87653 STREP B DNA AMP PROBE: CPT | Performed by: OBSTETRICS & GYNECOLOGY

## 2023-06-14 PROCEDURE — 87081 CULTURE SCREEN ONLY: CPT | Performed by: OBSTETRICS & GYNECOLOGY

## 2023-06-14 NOTE — ASSESSMENT & PLAN NOTE
GBS collected  Continue prenatal vitamins  Fetal kick counts  Labor instructions  Breast pump Rx provided

## 2023-06-14 NOTE — PROGRESS NOTES
OB FOLLOW UP    CC: Scheduled OB routine FU     Prenatal care complicated by:   Patient Active Problem List   Diagnosis    Palpitations    Syncope    Cysts of both ovaries    Supervision of other normal pregnancy, antepartum    Maternal anemia in pregnancy, antepartum    Tension type headache       Subjective:   Patient has: No complaints, No leaking fluid, No vaginal bleeding, Adequate FM  Reports still having a few contractions    Objective:  Urine glucose/protein- see flow sheet      /86   Wt 67.2 kg (148 lb 3.2 oz)   LMP 10/05/2022 (Exact Date)   BMI 25.44 kg/m²   See OB flow for LE edema, and cvx exam if applicable  FHT: 142 BPM   Uterine Size:  35 cm      Assessment and Plan:  Diagnoses and all orders for this visit:    1. Supervision of other normal pregnancy, antepartum (Primary)  Overview:  EDC: 7/12/2023  F/U sono: 4/13/23: EFW 1136 g (2lb 8oz) 66th%.  ORV[place]LE 13.7 cm, vertex, post placenta 2.6 cm from int os  Prenatal genetic screening: Declined    Tdap vaccine: Tdap Rx provided  Flu vaccine: Recommended  COVID-19 vaccine: Recommended    Assessment & Plan:  GBS collected  Continue prenatal vitamins  Fetal kick counts  Labor instructions  Breast pump Rx provided    Orders:  -     POC Urinalysis Dipstick  -     Group B Strep (Molecular) - Swab, Vaginal/Rectum    2. Maternal anemia in pregnancy, antepartum  Assessment & Plan:  Continue ferrous sulfate            36w0d  Reassuring pregnancy progress    Counseling: OB precautions, leaking, VB, brandie ayala vs PTL/Labor  FKC    Questions answered    Return in about 1 week (around 6/21/2023) for Recheck.      Indio Soliz MD  06/14/2023

## 2023-06-18 LAB — BACTERIA SPEC AEROBE CULT: NORMAL

## 2023-06-21 PROBLEM — O41.1230 CHORIOAMNIONITIS IN THIRD TRIMESTER: Status: ACTIVE | Noted: 2023-06-21

## 2023-06-21 PROBLEM — O42.92 FULL-TERM PREMATURE RUPTURE OF MEMBRANES: Status: ACTIVE | Noted: 2023-06-21

## 2023-08-11 NOTE — PROGRESS NOTES
POSTPARTUM Follow Up Visit      Chief Complaint   Patient presents with    Postpartum Care     HPI:      Date of delivery: 2023  Delivery type:   LTCS  Delivering Provider:   Dr. Taryn Beckett      Feeding: Breast  Pain:  no  Vaginal Bleeding:  no  Depressed/Anxious:  no  EPDS score: 2   #10: 0  Plans for BC:  Desires to discuss options  Weight at last OB OV:  148lbs   Last pap date and result: hasn't had one yet   Discharge Summary by Taryn Beckett DO (2023 05:58)    PHYSICAL EXAM:  /64   Wt 57.6 kg (127 lb)   LMP 2023 (Exact Date)   Breastfeeding Yes   BMI 21.80 kg/mý  Not found.  General- NAD, alert and oriented, appropriate  Psych- Normal mood, good memory, good eye contact    INCISION : Clean, dry, intact, no evidence of infection  Abdomen- Soft, non distended, non tender, no masses    External genitalia- Normal.    Urethra/Bladder/Vagina- Normal, no masses, non-tender, no prolapse     Cervix- Normal, no lesions, no discharge, no CMT  Uterus- Normal size, shape & consistency.  Non tender, mobile, & no prolapse  Adnexa- Normal, no mass, non-tender    Lymphatic- No palpable groin nodes  Extremities- No edema    ASSESSMENT AND PLAN:  Diagnoses and all orders for this visit:    1. POSTPARTUM (Primary)    2. BIRTH CONTROL  Comments:  Considering long-acting reversible options, pamphlets given for Nexplanon and Kyleena    3. Vaccine Counseling      Counseling:    All birth control options reviewed in detail.  R/B/A/SE/E of each wrt pts PMHx and prior BC use  May resume normal activities  Core strengthening exercises reviewed and recommended  Kegel exercises reviewed and recommended  Ok to return to work/school  Abstinence until IUD/Nexplanon placed, [facility] day prior to placement  After a CS, I recommend at least TWO years before delivery of her next child to allow complete healing of hysterotomy  Importance of COVID vaccine and booster  Importance of FLU vaccine        Follow Up:  Return  in about 3 weeks (around 9/5/2023) for IUD or Nex[facility]planon insertion, [facility]          Taryn Beckett, DO  08/15/2023    [facility]  [address] [place] [address]  Dept: [phone]  Dept Fax: [phone]  Loc: [phone]  Loc Fax: [phone]

## 2023-08-14 PROBLEM — N83.201 CYSTS OF BOTH OVARIES: Status: RESOLVED | Noted: 2022-09-29 | Resolved: 2023-08-14

## 2023-08-14 PROBLEM — O41.1230 CHORIOAMNIONITIS IN THIRD TRIMESTER: Status: RESOLVED | Noted: 2023-06-21 | Resolved: 2023-08-14

## 2023-08-14 PROBLEM — N83.202 CYSTS OF BOTH OVARIES: Status: RESOLVED | Noted: 2022-09-29 | Resolved: 2023-08-14

## 2023-08-14 PROBLEM — G44.209 TENSION TYPE HEADACHE: Status: RESOLVED | Noted: 2023-05-24 | Resolved: 2023-08-14

## 2023-08-14 PROBLEM — O99.019 MATERNAL ANEMIA IN PREGNANCY, ANTEPARTUM: Status: RESOLVED | Noted: 2023-05-08 | Resolved: 2023-08-14

## 2023-08-14 PROBLEM — Z34.80 SUPERVISION OF OTHER NORMAL PREGNANCY, ANTEPARTUM: Status: RESOLVED | Noted: 2022-12-20 | Resolved: 2023-08-14

## 2023-08-14 PROBLEM — O42.92 FULL-TERM PREMATURE RUPTURE OF MEMBRANES: Status: RESOLVED | Noted: 2023-06-21 | Resolved: 2023-08-14

## 2023-08-15 ENCOUNTER — TELEPHONE (OUTPATIENT)
Dept: OBSTETRICS AND GYNECOLOGY | Facility: CLINIC | Age: 23
End: 2023-08-15
Payer: COMMERCIAL

## 2023-08-15 ENCOUNTER — POSTPARTUM VISIT (OUTPATIENT)
Dept: OBSTETRICS AND GYNECOLOGY | Facility: CLINIC | Age: 23
End: 2023-08-15
Payer: COMMERCIAL

## 2023-08-15 VITALS — DIASTOLIC BLOOD PRESSURE: 64 MMHG | BODY MASS INDEX: 21.8 KG/M2 | SYSTOLIC BLOOD PRESSURE: 106 MMHG | WEIGHT: 127 LBS

## 2023-08-15 DIAGNOSIS — Z71.85 VACCINE COUNSELING: ICD-10-CM

## 2023-08-15 DIAGNOSIS — Z30.09 BIRTH CONTROL COUNSELING: ICD-10-CM

## 2023-08-15 PROCEDURE — 0502F SUBSEQUENT PRENATAL CARE: CPT | Performed by: OBSTETRICS & GYNECOLOGY

## 2023-08-23 ENCOUNTER — TELEPHONE (OUTPATIENT)
Dept: OBSTETRICS AND GYNECOLOGY | Facility: CLINIC | Age: 23
End: 2023-08-23
Payer: COMMERCIAL

## 2023-08-23 NOTE — TELEPHONE ENCOUNTER
I spoke with the patient when she called back and she has decided to hold off for now.  She is still unsure if she would like the Nexplanon placed just yet and will call back when she is ready to schedule the insertion or ask for something else.

## 2024-01-24 ENCOUNTER — TELEPHONE (OUTPATIENT)
Dept: OBSTETRICS AND GYNECOLOGY | Facility: CLINIC | Age: 24
End: 2024-01-24
Payer: COMMERCIAL

## 2024-01-24 NOTE — TELEPHONE ENCOUNTER
Rose is in the process of scheduling patient and Ultrasound.  Will call patient back when Utrasound gets scheduled

## 2024-01-24 NOTE — TELEPHONE ENCOUNTER
Caller: Genesis Carver    Relationship: Self    Best call back number: [phone]    What orders are you requesting (i.e. lab or imaging): POSSIBLE LABS OR U/S - AT HOME + PREGNANCY TEST     In what timeframe would the patient need to come in: PT STATES HER LMP IS LOGGED 11/29, HOWEVER SHE THINK AT THE LAST WEEK OF DEC SHE HAD A COUPLE DAYS OF LIGHT SPOTTING     Where will you receive your lab/imaging services:      Additional notes: PLEASE ADVISE, NEW OB SCHEDULED NEXT AVAILABLE 03/19.

## 2024-01-26 DIAGNOSIS — O36.80X0 PREGNANCY WITH UNCERTAIN FETAL VIABILITY, SINGLE OR UNSPECIFIED FETUS: Primary | ICD-10-CM

## 2024-02-14 ENCOUNTER — HOSPITAL ENCOUNTER (OUTPATIENT)
Dept: ULTRASOUND IMAGING | Facility: HOSPITAL | Age: 24
Discharge: HOME | End: 2024-02-14
Admitting: OBSTETRICS & GYNECOLOGY
Payer: COMMERCIAL

## 2024-02-14 DIAGNOSIS — O36.80X0 PREGNANCY WITH UNCERTAIN FETAL VIABILITY, SINGLE OR UNSPECIFIED FETUS: ICD-10-CM

## 2024-02-14 PROCEDURE — 76801 OB US < 14 WKS SINGLE FETUS: CPT

## 2024-03-11 ENCOUNTER — TELEPHONE (OUTPATIENT)
Dept: OBSTETRICS AND GYNECOLOGY | Facility: CLINIC | Age: 24
End: 2024-03-11
Payer: COMMERCIAL

## 2024-03-11 DIAGNOSIS — R11.2 NAUSEA AND VOMITING, UNSPECIFIED VOMITING TYPE: Primary | ICD-10-CM

## 2024-03-11 RX ORDER — ONDANSETRON 4 MG/1
TABLET, FILM COATED ORAL
Qty: 30 TABLET | Refills: 2 | Status: SHIPPED | OUTPATIENT
Start: 2024-03-11

## 2024-03-11 NOTE — TELEPHONE ENCOUNTER
John'd Rx for Zofran per protocol.  Next appointment 3/19/24 [facility].  Patient called no answer. Rx @ pharmacy.  John for Hub to Relay

## 2024-03-17 NOTE — PROGRESS NOTES
OB Initial Visit    CC- Here for care of current pregnancy     Subjective:  23 y.o.  presenting for her first obstetrical visit.    LMP: Patient's last menstrual period was 2023.     The patient has no complaints today.  She denies any vaginal bleeding or pelvic pain.  Some nausea but not too bad.    Reviewed and updated:  OBHx, GYNHx (STDs), PMHx, Medications, Allergies, PSHx, Social Hx, Preventative Hx (PAP), Hx of abuse/safe environment, Vaccine Hx including hx of chickenpox or vaccine, Genetic Hx (pt, FOB, both families).        Objective:  /76   LMP 2023   General- NAD, alert and oriented, appropriate  Psych- Normal mood, good memory  Neck- No masses, no thyroid enlargement  CV- Regular rhythm, no murnurs  Resp- CTA to bases, no wheezes  Abdomen- Soft, non distended, non tender, no masses   External genitalia- Normal, no lesions  Urethra- Normal, no masses, non tender  Vagina- Normal, no discharge  Bladder- Normal, no masses, non tender  Cvx- Normal, no lesions, no discharge, no CMT  Uterus- Normal shape and consistency, non tender, Consistent with dates  Adnexa- Normal, no mass, non tender  Lymphatic- No palpable neck, axillary, or groin nodes  Ext- No edema, no cyanosis    Skin- No lesions, no rashes, no acanthosis nigricans      Assessment and Plan:  Diagnoses and all orders for this visit:    1. Supervision of other normal pregnancy, antepartum (Primary)  Overview:  EDC: 10/1/2024    Prenatal genetic screening: Declined    Previous  delivery  Short interval pregnancy  The patient's first child had a tracheal hemangioma requiring surgical excision at 2 months of age    Assessment & Plan:  Continue prenatal vitamins  Check prenatal labs  Reviewed dating ultrasound.  EDC finalized.  Schedule anatomy ultrasound.  Discussed office visit schedule and call rotation  Reviewed medication safe in pregnancy  Declines prenatal genetic screening  Reviewed nutrition, exercise, and  appropriate weight gain in pregnancy    Orders:  -     IGP,CtNgTv,rfx Aptima HPV ASCU  -     OB Panel With HIV  -     POC Urinalysis Dipstick  -     Urine Culture - Urine, Urine, Clean Catch  -     Urine Drug Screen - Urine, Clean Catch  -     US Ob 14 + Weeks Single or First Gestation; Future    2. Previous  delivery affecting pregnancy  Overview:  Desires repeat              12w0d  Genetic Screening: Counseled.  Declines all.     Vaccines: Recommend FLU vaccine this season, R/B discussed  Recommend COVID vaccine, R/B discussed    Counseling: Nutrition discussed, calories, activity/exercise in pregnancy  Discussed dietary restrictions/safety food preparation in pregnancy  Reviewed what to expect prenatal visits, office providers (female and male) and covering [facility]/Dr. Talbert  Appropriate trimester precautions provided, N/V, vag bleeding, cramping  VACCINE importance in pregnancy discussed.  Maternal and fetal risk of not being vaccinated reviewed NLT increased risk maternal/fetal severity of illness/death, PTD, CS, hemorrhage, HTN, possible IUFD.  Significant maternal and fetal/infant benefit w vaccination.  FDA approval and ACOG/SMFM/CDC strong recommendation in pregnancy.  Questions answered.   Questions answered    Return in about 4 weeks (around 2024) for Recheck.      Indio Soliz MD  2024

## 2024-03-19 ENCOUNTER — INITIAL PRENATAL (OUTPATIENT)
Dept: OBSTETRICS AND GYNECOLOGY | Facility: CLINIC | Age: 24
End: 2024-03-19
Payer: COMMERCIAL

## 2024-03-19 VITALS — SYSTOLIC BLOOD PRESSURE: 112 MMHG | DIASTOLIC BLOOD PRESSURE: 76 MMHG

## 2024-03-19 DIAGNOSIS — Z34.80 SUPERVISION OF OTHER NORMAL PREGNANCY, ANTEPARTUM: Primary | ICD-10-CM

## 2024-03-19 DIAGNOSIS — O34.219 PREVIOUS CESAREAN DELIVERY AFFECTING PREGNANCY: ICD-10-CM

## 2024-03-19 LAB
ABO GROUP BLD: NORMAL
AMPHET+METHAMPHET UR QL: NEGATIVE
BARBITURATES UR QL SCN: NEGATIVE
BASOPHILS # BLD AUTO: 0.01 10*3/MM3 (ref 0–0.2)
BASOPHILS NFR BLD AUTO: 0.1 % (ref 0–1.5)
BENZODIAZ UR QL SCN: NEGATIVE
BLD GP AB SCN SERPL QL: NEGATIVE
CANNABINOIDS SERPL QL: NEGATIVE
COCAINE UR QL: NEGATIVE
DEPRECATED RDW RBC AUTO: 38.4 FL (ref 37–54)
EOSINOPHIL # BLD AUTO: 0.05 10*3/MM3 (ref 0–0.4)
EOSINOPHIL NFR BLD AUTO: 0.7 % (ref 0.3–6.2)
ERYTHROCYTE [DISTWIDTH] IN BLOOD BY AUTOMATED COUNT: 12.8 % (ref 12.3–15.4)
FENTANYL UR-MCNC: NEGATIVE NG/ML
GLUCOSE UR STRIP-MCNC: NEGATIVE MG/DL
HBV SURFACE AG SERPL QL IA: NORMAL
HCT VFR BLD AUTO: 38.3 % (ref 34–46.6)
HCV AB SER DONR QL: NORMAL
HGB BLD-MCNC: 12.7 G/DL (ref 12–15.9)
HIV 1+2 AB+HIV1 P24 AG SERPL QL IA: NORMAL
IMM GRANULOCYTES # BLD AUTO: 0.02 10*3/MM3 (ref 0–0.05)
IMM GRANULOCYTES NFR BLD AUTO: 0.3 % (ref 0–0.5)
LYMPHOCYTES # BLD AUTO: 1.83 10*3/MM3 (ref 0.7–3.1)
LYMPHOCYTES NFR BLD AUTO: 26 % (ref 19.6–45.3)
MCH RBC QN AUTO: 27.7 PG (ref 26.6–33)
MCHC RBC AUTO-ENTMCNC: 33.2 G/DL (ref 31.5–35.7)
MCV RBC AUTO: 83.6 FL (ref 79–97)
METHADONE UR QL SCN: NEGATIVE
MONOCYTES # BLD AUTO: 0.44 10*3/MM3 (ref 0.1–0.9)
MONOCYTES NFR BLD AUTO: 6.3 % (ref 5–12)
NEUTROPHILS NFR BLD AUTO: 4.69 10*3/MM3 (ref 1.7–7)
NEUTROPHILS NFR BLD AUTO: 66.6 % (ref 42.7–76)
NRBC BLD AUTO-RTO: 0 /100 WBC (ref 0–0.2)
OPIATES UR QL: NEGATIVE
OXYCODONE UR QL SCN: NEGATIVE
PLATELET # BLD AUTO: 255 10*3/MM3 (ref 140–450)
PMV BLD AUTO: 10.8 FL (ref 6–12)
PROT UR STRIP-MCNC: NEGATIVE MG/DL
RBC # BLD AUTO: 4.58 10*6/MM3 (ref 3.77–5.28)
RH BLD: POSITIVE
T PALLIDUM IGG SER QL: NORMAL
WBC NRBC COR # BLD AUTO: 7.04 10*3/MM3 (ref 3.4–10.8)

## 2024-03-19 PROCEDURE — 86803 HEPATITIS C AB TEST: CPT | Performed by: OBSTETRICS & GYNECOLOGY

## 2024-03-19 PROCEDURE — 87340 HEPATITIS B SURFACE AG IA: CPT | Performed by: OBSTETRICS & GYNECOLOGY

## 2024-03-19 PROCEDURE — 80307 DRUG TEST PRSMV CHEM ANLYZR: CPT | Performed by: OBSTETRICS & GYNECOLOGY

## 2024-03-19 PROCEDURE — 87591 N.GONORRHOEAE DNA AMP PROB: CPT | Performed by: OBSTETRICS & GYNECOLOGY

## 2024-03-19 PROCEDURE — 87086 URINE CULTURE/COLONY COUNT: CPT | Performed by: OBSTETRICS & GYNECOLOGY

## 2024-03-19 PROCEDURE — 87491 CHLMYD TRACH DNA AMP PROBE: CPT | Performed by: OBSTETRICS & GYNECOLOGY

## 2024-03-19 PROCEDURE — G0432 EIA HIV-1/HIV-2 SCREEN: HCPCS | Performed by: OBSTETRICS & GYNECOLOGY

## 2024-03-19 PROCEDURE — 86762 RUBELLA ANTIBODY: CPT | Performed by: OBSTETRICS & GYNECOLOGY

## 2024-03-19 PROCEDURE — 86850 RBC ANTIBODY SCREEN: CPT | Performed by: OBSTETRICS & GYNECOLOGY

## 2024-03-19 PROCEDURE — 86901 BLOOD TYPING SEROLOGIC RH(D): CPT | Performed by: OBSTETRICS & GYNECOLOGY

## 2024-03-19 PROCEDURE — 86780 TREPONEMA PALLIDUM: CPT | Performed by: OBSTETRICS & GYNECOLOGY

## 2024-03-19 PROCEDURE — 85025 COMPLETE CBC W/AUTO DIFF WBC: CPT | Performed by: OBSTETRICS & GYNECOLOGY

## 2024-03-19 PROCEDURE — 87661 TRICHOMONAS VAGINALIS AMPLIF: CPT | Performed by: OBSTETRICS & GYNECOLOGY

## 2024-03-19 PROCEDURE — G0123 SCREEN CERV/VAG THIN LAYER: HCPCS | Performed by: OBSTETRICS & GYNECOLOGY

## 2024-03-19 PROCEDURE — 86900 BLOOD TYPING SEROLOGIC ABO: CPT | Performed by: OBSTETRICS & GYNECOLOGY

## 2024-03-19 NOTE — PATIENT INSTRUCTIONS
Venipuncture Blood Specimen Collection  Venipuncture performed in left arm by Porsha Mansfield with good hemostasis. Patient tolerated the procedure well without complications.   03/19/24   Porsha Mansfield

## 2024-03-20 LAB
BACTERIA SPEC AEROBE CULT: NORMAL
RUBV IGG SERPL IA-ACNC: <0.9 INDEX

## 2024-03-21 PROBLEM — O09.899 SHORT INTERVAL BETWEEN PREGNANCIES AFFECTING PREGNANCY, ANTEPARTUM: Status: ACTIVE | Noted: 2024-03-21

## 2024-03-21 PROBLEM — O34.219 PREVIOUS CESAREAN DELIVERY AFFECTING PREGNANCY: Status: ACTIVE | Noted: 2024-03-21

## 2024-03-21 NOTE — ASSESSMENT & PLAN NOTE
Continue prenatal vitamins  Check prenatal labs  Reviewed dating ultrasound.  EDC finalized.  Schedule anatomy ultrasound.  Discussed office visit schedule and call rotation  Reviewed medication safe in pregnancy  Declines prenatal genetic screening  Reviewed nutrition, exercise, and appropriate weight gain in pregnancy

## 2024-03-21 NOTE — ASSESSMENT & PLAN NOTE
Reviewed the risks of short interval pregnancy including possible increased risk of miscarriage,  birth, and specifically given she had a previous  delivery possible increased risk for delivery complications such as uterine rupture.

## 2024-03-22 LAB
C TRACH RRNA CVX QL NAA+PROBE: NEGATIVE
CONV .: NORMAL
CYTOLOGIST CVX/VAG CYTO: NORMAL
CYTOLOGY CVX/VAG DOC CYTO: NORMAL
CYTOLOGY CVX/VAG DOC THIN PREP: NORMAL
DX ICD CODE: NORMAL
Lab: NORMAL
Lab: NORMAL
N GONORRHOEA RRNA CVX QL NAA+PROBE: NEGATIVE
OTHER STN SPEC: NORMAL
STAT OF ADQ CVX/VAG CYTO-IMP: NORMAL
T VAGINALIS RRNA SPEC QL NAA+PROBE: NEGATIVE

## 2024-04-17 ENCOUNTER — ROUTINE PRENATAL (OUTPATIENT)
Dept: OBSTETRICS AND GYNECOLOGY | Facility: CLINIC | Age: 24
End: 2024-04-17
Payer: COMMERCIAL

## 2024-04-17 VITALS — DIASTOLIC BLOOD PRESSURE: 55 MMHG | SYSTOLIC BLOOD PRESSURE: 96 MMHG

## 2024-04-17 DIAGNOSIS — O09.899 SHORT INTERVAL BETWEEN PREGNANCIES AFFECTING PREGNANCY, ANTEPARTUM: ICD-10-CM

## 2024-04-17 DIAGNOSIS — Z34.80 SUPERVISION OF OTHER NORMAL PREGNANCY, ANTEPARTUM: Primary | ICD-10-CM

## 2024-04-17 DIAGNOSIS — O34.219 PREVIOUS CESAREAN DELIVERY AFFECTING PREGNANCY: ICD-10-CM

## 2024-04-17 LAB
GLUCOSE UR STRIP-MCNC: NEGATIVE MG/DL
PROT UR STRIP-MCNC: NEGATIVE MG/DL

## 2024-04-17 PROCEDURE — 0502F SUBSEQUENT PRENATAL CARE: CPT | Performed by: OBSTETRICS & GYNECOLOGY

## 2024-04-17 NOTE — PROGRESS NOTES
[facility]  OB Follow Up Visit    CC: Routine obstetrical visit    Prenatal care complicated by:  Patient Active Problem List   Diagnosis    Palpitations    Syncope    Supervision of other normal pregnancy, antepartum    Previous  delivery affecting pregnancy    Short interval between pregnancies affecting pregnancy, antepartum     Subjective:   Genesis Carver is a 23 y.o.  16w1d patient being seen today for her obstetrical follow up visit. The patient has: No complaints, No leaking fluid, No vaginal bleeding, No contractions, Adequate FM    History: Past medical and surgical history, medications, allergies, social history, and obstetrical history all reviewed and updated.    Objective:    Urine glucose/protein - See OB flow sheet      BP 96/55   LMP 2023     General exam: Comfortable, NAD  FHR: 147 BPM   Uterine Size: size equals dates  Pelvic Exam: No    Assessment and Plan:  Diagnoses and all orders for this visit:    1. Supervision of other normal pregnancy, antepartum (Primary)  Overview:  EDC: 10/1/2024    Prenatal genetic screening: Declined    Previous  delivery  Short interval pregnancy  The patient's first child had a tracheal hemangioma requiring surgical excision at 2 months of age    Assessment & Plan:  Reviewed prenatal labs  Continue prenatal vitamins  Anatomy ultrasound next office visit    Orders:  -     POC Urinalysis Dipstick    2. Short interval between pregnancies affecting pregnancy, antepartum  Overview:  Discussed the risks of short interval pregnancy.      3. Previous  delivery affecting pregnancy  Overview:  Desires repeat        16w1d  Reassuring pregnancy progress    Counseling: Second trimester precautions  OB precautions, leaking, VB, brandie ayala vs PTL/Labor    Questions answered    Return in about 4 weeks (around 5/15/2024) for Recheck.    Indio Soliz MD  2024

## 2024-05-13 NOTE — PROGRESS NOTES
[facility]  OB Follow Up Visit    CC: Routine obstetrical visit    Prenatal care complicated by:  Patient Active Problem List   Diagnosis    Palpitations    Syncope    Supervision of other normal pregnancy, antepartum    Previous  delivery affecting pregnancy    Short interval between pregnancies affecting pregnancy, antepartum     Subjective:   Genesis Carver is a 23 y.o.  19w6d patient being seen today for her obstetrical follow up visit. The patient has: No complaints, No leaking fluid, No vaginal bleeding, No contractions, Adequate FM    History: Past medical and surgical history, medications, allergies, social history, and obstetrical history all reviewed and updated.    Objective:    Urine glucose/protein - See OB flow sheet      /75   Wt 61.2 kg (135 lb)   LMP 2023   BMI 23.17 kg/m²     General exam: Comfortable, NAD  FHR: 149 BPM   Uterine Size:  20 cm  Pelvic Exam: No    Assessment and Plan:  Diagnoses and all orders for this visit:    1. Supervision of other normal pregnancy, antepartum (Primary)  Overview:  EDC: 10/1/2024    Prenatal genetic screening: Declined    Previous  delivery  Short interval pregnancy  The patient's first child had a tracheal hemangioma requiring surgical excision at 2 months of age    Assessment & Plan:  Reviewed today's anatomy ultrasound.  Normal anatomy.  Posterior placenta.  Recommend follow-up studies as clinically indicated  Continue prenatal vitamin  1 hour GTT next office visit    Orders:  -     POC Urinalysis Dipstick    2. Short interval between pregnancies affecting pregnancy, antepartum  Overview:  Discussed the risks of short interval pregnancy.      3. Previous  delivery affecting pregnancy  Overview:  x1  Desires repeat  Placenta: Posterior        19w6d  Reassuring pregnancy progress    Counseling: Second trimester precautions  OB precautions, leaking, VB, brandie ayala vs PTL/Labor    Questions  answered    Return in about 4 weeks (around 6/11/2024) for Recheck.    Indio Soliz MD  05/14/2024

## 2024-05-14 ENCOUNTER — ROUTINE PRENATAL (OUTPATIENT)
Dept: OBSTETRICS AND GYNECOLOGY | Facility: CLINIC | Age: 24
End: 2024-05-14
Payer: COMMERCIAL

## 2024-05-14 VITALS — WEIGHT: 135 LBS | DIASTOLIC BLOOD PRESSURE: 75 MMHG | SYSTOLIC BLOOD PRESSURE: 117 MMHG | BODY MASS INDEX: 23.17 KG/M2

## 2024-05-14 DIAGNOSIS — O34.219 PREVIOUS CESAREAN DELIVERY AFFECTING PREGNANCY: ICD-10-CM

## 2024-05-14 DIAGNOSIS — Z34.80 SUPERVISION OF OTHER NORMAL PREGNANCY, ANTEPARTUM: Primary | ICD-10-CM

## 2024-05-14 DIAGNOSIS — O09.899 SHORT INTERVAL BETWEEN PREGNANCIES AFFECTING PREGNANCY, ANTEPARTUM: ICD-10-CM

## 2024-05-14 LAB
GLUCOSE UR STRIP-MCNC: NEGATIVE MG/DL
PROT UR STRIP-MCNC: NEGATIVE MG/DL

## 2024-05-14 PROCEDURE — 0502F SUBSEQUENT PRENATAL CARE: CPT | Performed by: OBSTETRICS & GYNECOLOGY

## 2024-05-14 NOTE — ASSESSMENT & PLAN NOTE
Reviewed today's anatomy ultrasound.  Normal anatomy.  Posterior placenta.  Recommend follow-up studies as clinically indicated  Continue prenatal vitamin  1 hour GTT next office visit

## 2024-05-15 ENCOUNTER — TELEPHONE (OUTPATIENT)
Dept: OBSTETRICS AND GYNECOLOGY | Facility: CLINIC | Age: 24
End: 2024-05-15
Payer: COMMERCIAL

## 2024-06-12 ENCOUNTER — ROUTINE PRENATAL (OUTPATIENT)
Dept: OBSTETRICS AND GYNECOLOGY | Facility: CLINIC | Age: 24
End: 2024-06-12
Payer: COMMERCIAL

## 2024-06-12 VITALS — SYSTOLIC BLOOD PRESSURE: 125 MMHG | WEIGHT: 139 LBS | DIASTOLIC BLOOD PRESSURE: 76 MMHG | BODY MASS INDEX: 23.86 KG/M2

## 2024-06-12 DIAGNOSIS — O34.219 PREVIOUS CESAREAN DELIVERY AFFECTING PREGNANCY: ICD-10-CM

## 2024-06-12 DIAGNOSIS — Z34.80 SUPERVISION OF OTHER NORMAL PREGNANCY, ANTEPARTUM: Primary | ICD-10-CM

## 2024-06-12 DIAGNOSIS — O09.899 SHORT INTERVAL BETWEEN PREGNANCIES AFFECTING PREGNANCY, ANTEPARTUM: ICD-10-CM

## 2024-06-12 LAB
DEPRECATED RDW RBC AUTO: 40.6 FL (ref 37–54)
ERYTHROCYTE [DISTWIDTH] IN BLOOD BY AUTOMATED COUNT: 13 % (ref 12.3–15.4)
GLUCOSE 1H P GLC SERPL-MCNC: 110 MG/DL (ref 65–139)
GLUCOSE UR STRIP-MCNC: NEGATIVE MG/DL
HCT VFR BLD AUTO: 36.5 % (ref 34–46.6)
HGB BLD-MCNC: 11.9 G/DL (ref 12–15.9)
MCH RBC QN AUTO: 28.1 PG (ref 26.6–33)
MCHC RBC AUTO-ENTMCNC: 32.6 G/DL (ref 31.5–35.7)
MCV RBC AUTO: 86.3 FL (ref 79–97)
PLATELET # BLD AUTO: 254 10*3/MM3 (ref 140–450)
PMV BLD AUTO: 10.4 FL (ref 6–12)
PROT UR STRIP-MCNC: NEGATIVE MG/DL
RBC # BLD AUTO: 4.23 10*6/MM3 (ref 3.77–5.28)
WBC NRBC COR # BLD AUTO: 9.69 10*3/MM3 (ref 3.4–10.8)

## 2024-06-12 PROCEDURE — 85027 COMPLETE CBC AUTOMATED: CPT | Performed by: OBSTETRICS & GYNECOLOGY

## 2024-06-12 PROCEDURE — 82950 GLUCOSE TEST: CPT | Performed by: OBSTETRICS & GYNECOLOGY

## 2024-06-12 NOTE — PROGRESS NOTES
[facility]  OB Follow Up Visit    CC: Routine obstetrical visit    Prenatal care complicated by:  Patient Active Problem List   Diagnosis    Palpitations    Syncope    Supervision of other normal pregnancy, antepartum    Previous  delivery affecting pregnancy    Short interval between pregnancies affecting pregnancy, antepartum     Subjective:   Genesis Carver is a 23 y.o.  24w1d patient being seen today for her obstetrical follow up visit. The patient has: No complaints, No leaking fluid, No vaginal bleeding, No contractions, Adequate FM    History: Past medical and surgical history, medications, allergies, social history, and obstetrical history all reviewed and updated.    Objective:    Urine glucose/protein - See OB flow sheet      /76   Wt 63 kg (139 lb)   LMP 2023   BMI 23.86 kg/m²     General exam: Comfortable, NAD  FHR: 145 BPM   Uterine Size:  24 cm  Pelvic Exam: No    Assessment and Plan:  Diagnoses and all orders for this visit:    1. Supervision of other normal pregnancy, antepartum (Primary)  Overview:  EDC: 10/1/2024    Prenatal genetic screening: Declined    Previous  delivery  Short interval pregnancy  The patient's first child had a tracheal hemangioma requiring surgical excision at 2 months of age    Assessment & Plan:  Continue prenatal vitamins  Fetal kick counts   labor warnings  1 hour GTT today    Orders:  -     POC Urinalysis Dipstick  -     CBC (No Diff); Future  -     Gestational Diabetes Screen 1 Hour; Future  -     CBC (No Diff)  -     Gestational Diabetes Screen 1 Hour    2. Short interval between pregnancies affecting pregnancy, antepartum  Overview:  Discussed the risks of short interval pregnancy.      3. Previous  delivery affecting pregnancy  Overview:  x1  Desires repeat  Placenta: Posterior        24w1d  Reassuring pregnancy progress    Counseling: OB precautions, leaking, VB, brandie ayala vs  PTL/Labor  FKC    Questions answered    Return in about 4 weeks (around 7/10/2024) for Recheck.    Indio Soliz MD  06/12/2024

## 2024-07-09 ENCOUNTER — TELEPHONE (OUTPATIENT)
Dept: OBSTETRICS AND GYNECOLOGY | Facility: CLINIC | Age: 24
End: 2024-07-09

## 2024-07-09 ENCOUNTER — ROUTINE PRENATAL (OUTPATIENT)
Dept: OBSTETRICS AND GYNECOLOGY | Facility: CLINIC | Age: 24
End: 2024-07-09
Payer: COMMERCIAL

## 2024-07-09 VITALS — DIASTOLIC BLOOD PRESSURE: 61 MMHG | SYSTOLIC BLOOD PRESSURE: 110 MMHG | BODY MASS INDEX: 24.72 KG/M2 | WEIGHT: 144 LBS

## 2024-07-09 DIAGNOSIS — O34.219 PREVIOUS CESAREAN DELIVERY AFFECTING PREGNANCY: ICD-10-CM

## 2024-07-09 DIAGNOSIS — O09.899 SHORT INTERVAL BETWEEN PREGNANCIES AFFECTING PREGNANCY, ANTEPARTUM: ICD-10-CM

## 2024-07-09 DIAGNOSIS — Z34.80 SUPERVISION OF OTHER NORMAL PREGNANCY, ANTEPARTUM: Primary | ICD-10-CM

## 2024-07-09 PROBLEM — Z30.09 UNWANTED FERTILITY: Status: ACTIVE | Noted: 2024-07-09

## 2024-07-09 LAB
GLUCOSE UR STRIP-MCNC: NEGATIVE MG/DL
PROT UR STRIP-MCNC: NEGATIVE MG/DL

## 2024-07-09 PROCEDURE — 0502F SUBSEQUENT PRENATAL CARE: CPT | Performed by: OBSTETRICS & GYNECOLOGY

## 2024-07-09 NOTE — PROGRESS NOTES
[facility]  OB Follow Up Visit    CC: Routine obstetrical visit    Prenatal care complicated by:  Patient Active Problem List   Diagnosis    Palpitations    Syncope    Supervision of other normal pregnancy, antepartum    Previous  delivery affecting pregnancy    Short interval between pregnancies affecting pregnancy, antepartum    Unwanted fertility     Subjective:   Genesis Carver is a 23 y.o.  28w0d patient being seen today for her obstetrical follow up visit. The patient has: No complaints, No leaking fluid, No vaginal bleeding, No contractions, Adequate FM    History: Past medical and surgical history, medications, allergies, social history, and obstetrical history all reviewed and updated.    Objective:    Urine glucose/protein - See OB flow sheet      /61   Wt 65.3 kg (144 lb)   LMP 2023   BMI 24.72 kg/m²     General exam: Comfortable, NAD  FHR: 148 BPM   Uterine Size:  27 cm  Pelvic Exam: No    Assessment and Plan:  Diagnoses and all orders for this visit:    1. Supervision of other normal pregnancy, antepartum (Primary)  Overview:  EDC: 10/1/2024    Prenatal genetic screening: Declined    Previous  delivery  Short interval pregnancy  The patient's first child had a tracheal hemangioma requiring surgical excision at 2 months of age    Assessment & Plan:  Continue prenatal vitamins  Fetal kick counts   labor warnings    Orders:  -     POC Urinalysis Dipstick    2. Short interval between pregnancies affecting pregnancy, antepartum  Overview:  Discussed the risks of short interval pregnancy.      3. Previous  delivery affecting pregnancy  Overview:  x1  Desires repeat  Placenta: Posterior        28w0d  Reassuring pregnancy progress    Counseling: OB precautions, leaking, VB, brandie ayala vs PTL/Labor  FKC    Questions answered    Return in about 2 weeks (around 2024) for Recheck.    Indio Soliz MD  2024

## 2024-07-09 NOTE — ASSESSMENT & PLAN NOTE
We have discussed the risks, benefits, and alternatives to the procedure.  We discussed the risks including the risk of infection, bleeding and hemorrhage, injury to nearby structure including, bowel, bladder, pelvic vasculature, pelvic nerves, ovaries, and the uterus, and other nearby structures.  We have discussed the risks of regret, risk of failure, and if failure occurred and increased risk for ectopic pregnancy.  We discussed the risk of menstrual changes, hormonal changes, and risk of pelvic pain post sterilization.  The patient has been offered alternative forms of birth control including: Oral contraceptives, NuvaRing, birth control patches, progesterone only birth control pills, Depo-Provera, all intrauterine contraceptives, partner vasectomy.  The patient expresses her understanding and wished to proceed with female permanent sterilization.

## 2024-07-09 NOTE — TELEPHONE ENCOUNTER
Caller: Genesis Carver    Relationship to patient: Self    Best call back number: [phone]    Type of visit: OB FOLLOW UP    Additional notes:PATIENT CALLED IN TO LET  STAFF KNOW THAT SHE IS NOT AVAILABLE ON 7/22/24 OR 7/26/24 FOR HER NEXT OB FOLLOW UP. SHE WAS SEEN IN THE OFFICE TODAY AND IS WAITING ON A CALL FOR HER NEXT APPT. THANK YOU.

## 2024-07-18 ENCOUNTER — HOSPITAL ENCOUNTER (OUTPATIENT)
Facility: HOSPITAL | Age: 24
Discharge: HOME | End: 2024-07-18
Attending: OBSTETRICS & GYNECOLOGY | Admitting: OBSTETRICS & GYNECOLOGY
Payer: COMMERCIAL

## 2024-07-18 VITALS
TEMPERATURE: 98.5 F | HEART RATE: 77 BPM | RESPIRATION RATE: 18 BRPM | DIASTOLIC BLOOD PRESSURE: 52 MMHG | SYSTOLIC BLOOD PRESSURE: 98 MMHG

## 2024-07-18 PROBLEM — R10.2 PELVIC PAIN IN PREGNANCY, ANTEPARTUM, THIRD TRIMESTER: Status: ACTIVE | Noted: 2024-07-18

## 2024-07-18 PROBLEM — O26.893 PELVIC PAIN IN PREGNANCY, ANTEPARTUM, THIRD TRIMESTER: Status: ACTIVE | Noted: 2024-07-18

## 2024-07-18 LAB
BILIRUB UR QL STRIP: NEGATIVE
CLARITY UR: ABNORMAL
COLOR UR: YELLOW
GLUCOSE UR STRIP-MCNC: NEGATIVE MG/DL
HGB UR QL STRIP.AUTO: NEGATIVE
KETONES UR QL STRIP: NEGATIVE
LEUKOCYTE ESTERASE UR QL STRIP.AUTO: NEGATIVE
NITRITE UR QL STRIP: NEGATIVE
PH UR STRIP.AUTO: 7 [PH] (ref 5–8)
PROT UR QL STRIP: ABNORMAL
SP GR UR STRIP: 1.02 (ref 1–1.03)
UROBILINOGEN UR QL STRIP: ABNORMAL

## 2024-07-18 PROCEDURE — 59025 FETAL NON-STRESS TEST: CPT

## 2024-07-18 PROCEDURE — G0463 HOSPITAL OUTPT CLINIC VISIT: HCPCS

## 2024-07-18 PROCEDURE — 25010000002 LIDOCAINE 1 % SOLUTION 10 ML VIAL: Performed by: OBSTETRICS & GYNECOLOGY

## 2024-07-18 PROCEDURE — 87086 URINE CULTURE/COLONY COUNT: CPT | Performed by: OBSTETRICS & GYNECOLOGY

## 2024-07-18 PROCEDURE — 81003 URINALYSIS AUTO W/O SCOPE: CPT | Performed by: OBSTETRICS & GYNECOLOGY

## 2024-07-18 PROCEDURE — 96372 THER/PROPH/DIAG INJ SC/IM: CPT

## 2024-07-18 PROCEDURE — 25010000002 CEFTRIAXONE PER 250 MG: Performed by: OBSTETRICS & GYNECOLOGY

## 2024-07-18 RX ORDER — CEFTRIAXONE 2 G/1
1 INJECTION, POWDER, FOR SOLUTION INTRAMUSCULAR; INTRAVENOUS ONCE
Status: DISCONTINUED | OUTPATIENT
Start: 2024-07-18 | End: 2024-07-18

## 2024-07-18 RX ORDER — NITROFURANTOIN 25; 75 MG/1; MG/1
100 CAPSULE ORAL 2 TIMES DAILY
Qty: 10 CAPSULE | Refills: 0 | Status: SHIPPED | OUTPATIENT
Start: 2024-07-18

## 2024-07-18 RX ADMIN — CEFTRIAXONE SODIUM 1 G: 1 INJECTION, POWDER, FOR SOLUTION INTRAMUSCULAR; INTRAVENOUS at 16:32

## 2024-07-18 NOTE — NON STRESS TEST
Obstetrical Non-stress Test Interpretation     Name:  Genesis Carver  MRN: [id number]    23 y.o. female  at 29w2d    Indication: lower abdominal pain, back pain       Fetal Assessment  Fetal HR Assessment Method: external  Fetal HR (beats/min): 140  Fetal HR Baseline: normal range  Fetal HR Variability: moderate (amplitude range 6 to 25 bpm)  Fetal HR Accelerations: greater than/equal to 10 bpm (32 wks gest or less), lasts at least 10 seconds (32 wks gest or less)  Fetal HR Decelerations: absent  Sinusoidal Pattern Present: absent    BP 98/52   Pulse 77   Resp 18   LMP 2023     Reason for test: OB Triage (abdominal pain, back pain)  Date of Test: 2024  Time frame of test: 7410-9768  RN NST Interpretation: Reactive      Magalys Rodríguez RN  2024  16:54 EDT

## 2024-07-18 NOTE — H&P
AKIL Funk  Obstetric History and Physical    Chief Complaint   Patient presents with    Back Pain    Abdominal Pain       Subjective     HPI:    Patient is a 23 y.o. female  currently at 29w2d, who presents with pelvic pressure, lower abdominal cramping, and lower back pain.    Patient reports she thinks this could be a urinary tract infection as she has had symptoms similar to these before when she was diagnosed with a UTI.  Patient states she saw her family medicine provider yesterday and was tested for a UTI.  Patient states they told her that the urine dip was negative for infection but they did send out a urine culture.     Patient states a similar thing happened to her during her prior pregnancy where the urine dip did not show evidence of infection, but the urine culture came back positive for a urinary tract infection.  Patient is thinking the same thing is happening again.    Patient denies fevers and chills, she does report a sense of incomplete emptying when urinating, she denies hematuria, she denies dysuria, and has also noticed an increased urge to urinate throughout the day.    Patient reports positive fetal movement, denies vaginal bleeding, denies leakage of fluid, and is not sure if she is having contractions, but again notes lower abdominal cramping and pelvic pressure.    [place] by:  [facility] [place]. Her prenatal care is complicated by palpitations, syncope, and maternal rubella nonimmune status.  Her previous obstetric/gynecological history is noted for history of full-term  section x 1 and 1 miscarriage.    The following portions of the patients history were reviewed and updated as appropriate:   current medications, allergies, past medical history, past surgical history, past family history, past social history and current problem list.     Prenatal Information:  Prenatal Results       Initial Prenatal Labs       Test Value Reference Range Date Time    Hemoglobin  12.7 g/dL 12.0 -  15.9 03/19/24 1045    Hematocrit  38.3 % 34.0 - 46.6 03/19/24 1045    Platelets  255 10*3/mm3 140 - 450 03/19/24 1045    Rubella IgG  <0.90 index Immune >0.99 03/19/24 1045    Hepatitis B SAg  Non-Reactive  Non-Reactive 03/19/24 1045    Hepatitis C Ab  Non-Reactive  Non-Reactive 03/19/24 1045    RPR        T. Pallidum Ab   Non-Reactive  Non-Reactive 03/19/24 1045    ABO  B   03/19/24 1045    Rh  Positive   03/19/24 1045    Antibody Screen  Negative   03/19/24 1045    HIV  Non-Reactive  Non-Reactive 03/19/24 1045    Urine Culture  25,000 CFU/mL Normal Urogenital Priscilla   03/19/24 1036    Gonorrhea  Negative  Negative 03/19/24 1036    Chlamydia  Negative  Negative 03/19/24 1036    TSH        HgB A1c         Varicella IgG        Hemoglobinopathy Fractionation  Comment   09/09/22 1414    Hemoglobinopathy (genetic testing)        Cystic fibrosis                   Fetal testing        Test Value Reference Range Date Time    NIPT        MSAFP        AFP-4                  2nd and 3rd Trimester       Test Value Reference Range Date Time    Hemoglobin (repeated)  11.9 g/dL 12.0 - 15.9 06/12/24 1501    Hematocrit (repeated)  36.5 % 34.0 - 46.6 06/12/24 1501    Platelets   254 10*3/mm3 140 - 450 06/12/24 1501       255 10*3/mm3 140 - 450 03/19/24 1045    1 hour GTT   110 mg/dL 65 - 139 06/12/24 1501    Antibody Screen (repeated)        3rd TM syphilis scrn (repeated)  RPR         3rd TM syphilis scrn (repeated) TP-Ab        3rd TM syphilis screen TB-Ab (FTA)        Syphilis cascade test TP-Ab (EIA)        Syphilis cascade TPPA        GTT Fasting        GTT 1 Hr        GTT 2 Hr        GTT 3 Hr        Group B Strep                  Other testing        Test Value Reference Range Date Time    Parvo IgG         CMV IgG                   Drug Screening       Test Value Reference Range Date Time    Amphetamine Screen        Barbiturate Screen  Negative  Negative 03/19/24 1036    Benzodiazepine Screen  Negative  Negative 03/19/24 1036     Methadone Screen  Negative  Negative 03/19/24 1036    Phencyclidine Screen        Opiates Screen  Negative  Negative 03/19/24 1036    THC Screen  Negative  Negative 03/19/24 1036    Cocaine Screen  Negative  Negative 03/19/24 1036    Propoxyphene Screen        Buprenorphine Screen        Methamphetamine Screen        Oxycodone Screen  Negative  Negative 03/19/24 1036    Tricyclic Antidepressants Screen                  Legend    ^: Historical                          External Prenatal Results       Pregnancy Outside Results - Transcribed From Office Records - See Scanned Records For Details       Test Value Date Time    ABO  B  03/19/24 1045    Rh  Positive  03/19/24 1045    Antibody Screen  Negative  03/19/24 1045    Varicella IgG       Rubella  <0.90 index 03/19/24 1045    Hgb  11.9 g/dL 06/12/24 1501       12.7 g/dL 03/19/24 1045    Hct  36.5 % 06/12/24 1501       38.3 % 03/19/24 1045    HgB A1c        1h GTT  110 mg/dL 06/12/24 1501    3h GTT Fasting       3h GTT 1 hour       3h GTT 2 hour       3h GTT 3 hour        Gonorrhea (discrete)  Negative  03/19/24 1036    Chlamydia (discrete)  Negative  03/19/24 1036    RPR       Syphilis Antibody       HBsAg  Non-Reactive  03/19/24 1045    Herpes Simplex Virus PCR       Herpes Simplex VIrus Culture       HIV  Non-Reactive  03/19/24 1045    Hep C RNA Quant PCR       Hep C Antibody  Non-Reactive  03/19/24 1045    AFP       NIPT       Cystic Fibroisis        Group B Strep       GBS Susceptibility to Clindamycin       GBS Susceptibility to Erythromycin       Fetal Fibronectin       Genetic Testing, Maternal Blood                 Drug Screening       Test Value Date Time    Urine Drug Screen       Amphetamine Screen       Barbiturate Screen  Negative  03/19/24 1036    Benzodiazepine Screen  Negative  03/19/24 1036    Methadone Screen  Negative  03/19/24 1036    Phencyclidine Screen       Opiates Screen  Negative  03/19/24 1036    THC Screen  Negative  03/19/24 1036     Cocaine Screen       Propoxyphene Screen       Buprenorphine Screen       Methamphetamine Screen       Oxycodone Screen  Negative  24 1036    Tricyclic Antidepressants Screen                 Legend    ^: Historical                             Past OB History:     OB History    Para Term  AB Living   3 1 1 0 1 1   SAB IAB Ectopic Molar Multiple Live Births   1 0 0 0 0 1      # Outcome Date GA Lbr Govind/2nd Weight Sex Type Anes PTL Lv   3 Current            2 Term 23 37w1d 23:20 / 03:01 2760 g (6 lb 1.4 oz) M CS-LTranv EPI N CONSTANCE      Complications: Intraamniotic Infection, Failure to Progress in Second Stage      Name: ODILIA BEJARANO      Apgar1: 8  Apgar5: 9   1 SAB                Past Medical History: Past Medical History:   Diagnosis Date    Complete miscarriage 2022    Reflex tachycardia     Single delivery by  2023      Past Surgical History Past Surgical History:   Procedure Laterality Date     SECTION N/A 2023    Procedure:  SECTION PRIMARY;  Surgeon: Taryn Beckett DO;  Location: [facility] LABOR DELIVERY;  Service: Gynecology;  Laterality: N/A;      Family History: Family History   Problem Relation Age of Onset    No Known Problems Father     Breast cancer Neg Hx     Ovarian cancer Neg Hx     Uterine cancer Neg Hx     Colon cancer Neg Hx     Melanoma Neg Hx     Prostate cancer Neg Hx       Social History:  reports that she has never smoked. She has never been exposed to tobacco smoke. She has never used smokeless tobacco.   reports no history of alcohol use.   reports no history of drug use.        General ROS: Pertinent items are noted in HPI    Home Medications:  Prenatal MV-Min-Fe Fum-FA-DHA and nitrofurantoin (macrocrystal-monohydrate)    Allergies:  No Known Allergies    Objective       Vital Signs Range for the last 24 hours  Temperature: Temp:  [98.5 °F (36.9 °C)] 98.5 °F (36.9 °C)   Temp Source: Temp src: Oral   BP: BP: ()/(52-62) 98/52    Pulse: Heart Rate:  [72-81] 77   Respirations: Resp:  [18] 18   SPO2:       Physical Examination:   General appearance - alert, well appearing, and in no distress  Mental status - alert, oriented to person, place, and time  Chest - clear to auscultation  Heart - normal rate, regular rhythm,  Abdomen - soft, nontender, gravid, no significant evidence of round ligament pain  Pelvic - normal external genitalia, vulva, vagina, cervix, and uterus   Back exam - full range of motion, no tenderness or pain on motion, ? slight right CVA tenderness to palpation, no left CVA tenderness to palpation  Neurological - alert, oriented, normal speech  Extremities - peripheral pulses normal  Skin - normal coloration and turgor, no suspicious skin lesions noted    Presentation: Not assessed   Cervix: Method: sterile exam per physician (Dr Jonas)   Dilation: Cervical Dilation (cm): 0   Effacement: Cervical Effacement: 50%   Station: -3       Fetal Heart Rate Assessment   Method: Fetal HR Assessment Method: external   Beats/min: Fetal HR (beats/min): 140   Baseline: Fetal HR Baseline: normal range   Variability: Fetal HR Variability: moderate (amplitude range 6 to 25 bpm)   Accels: Fetal HR Accelerations: greater than/equal to 10 bpm (32 wks gest or less), lasts at least 10 seconds (32 wks gest or less)   Decels: Fetal HR Decelerations: absent   Tracing Category:       Uterine Assessment   Method: Method: palpation, external tocotransducer   Frequency (min):     Ctx Count in 10 min:     Duration:     Intensity: Contraction Intensity: mild by palpation   Paris Units:       GBS is unknown     Assessment & Plan       Pelvic pain in pregnancy, antepartum, third trimester  Likely urinary tract infection in pregnancy      Assessment:  1.  Intrauterine pregnancy at 29w2d gestation with reassuring fetal status.    2.  Likely UTI  3.  Obstetrical history significant for history of  section x 1, history of miscarriage x  1.      Plan:  1. Lower abdominal cramping/pelvic pressure/back pain/sense of incomplete bladder emptying  - Patient reports her symptoms are very similar to the ones she had when she was diagnosed with a UTI in a prior pregnancy  - Patient states she has had the experience of having a normal urinalysis with a urine culture that returned positive for infection  - Urinalysis negative for infection in triage today, but given patient's history and symptoms, will empirically treat with ceftriaxone 1 g IM x 1  - Rx for Macrobid 100 mg p.o. twice daily x 5 days also E scribed to patient's pharmacy on file and patient is aware that I am sending in an antibiotic for her to continue for an additional 5 days  - Strict pyelonephritis precautions reviewed  - Urine culture ordered, but the order appears to have been canceled as I am not able to find it in the patient's chart.  Lab contacted and lab staff states they will add on urine culture for patient    2. FWB  - NST appropriate for gestational age    Strict  labor precautions reviewed with patient    Plan of care has been reviewed with patient and patient agrees.   Risks, benefits of treatment plan have been discussed.  All questions have been answered.        Electronically signed by Eileen Jonas MD, 24, 3:30 PM EDT.

## 2024-07-21 LAB — BACTERIA SPEC AEROBE CULT: NORMAL

## 2024-07-23 ENCOUNTER — ROUTINE PRENATAL (OUTPATIENT)
Dept: OBSTETRICS AND GYNECOLOGY | Facility: CLINIC | Age: 24
End: 2024-07-23
Payer: COMMERCIAL

## 2024-07-23 VITALS — DIASTOLIC BLOOD PRESSURE: 66 MMHG | SYSTOLIC BLOOD PRESSURE: 107 MMHG | BODY MASS INDEX: 25.06 KG/M2 | WEIGHT: 146 LBS

## 2024-07-23 DIAGNOSIS — O34.219 PREVIOUS CESAREAN DELIVERY AFFECTING PREGNANCY: ICD-10-CM

## 2024-07-23 DIAGNOSIS — Z30.09 UNWANTED FERTILITY: ICD-10-CM

## 2024-07-23 DIAGNOSIS — O09.899 SHORT INTERVAL BETWEEN PREGNANCIES AFFECTING PREGNANCY, ANTEPARTUM: ICD-10-CM

## 2024-07-23 DIAGNOSIS — Z34.80 SUPERVISION OF OTHER NORMAL PREGNANCY, ANTEPARTUM: Primary | ICD-10-CM

## 2024-07-23 PROBLEM — R10.2 PELVIC PAIN IN PREGNANCY, ANTEPARTUM, THIRD TRIMESTER: Status: RESOLVED | Noted: 2024-07-18 | Resolved: 2024-07-23

## 2024-07-23 PROBLEM — R00.2 PALPITATIONS: Status: RESOLVED | Noted: 2021-09-20 | Resolved: 2024-07-23

## 2024-07-23 PROBLEM — O26.893 PELVIC PAIN IN PREGNANCY, ANTEPARTUM, THIRD TRIMESTER: Status: RESOLVED | Noted: 2024-07-18 | Resolved: 2024-07-23

## 2024-07-23 LAB
GLUCOSE UR STRIP-MCNC: NEGATIVE MG/DL
PROT UR STRIP-MCNC: NEGATIVE MG/DL

## 2024-07-23 NOTE — PROGRESS NOTES
OB FOLLOW UP  Complaint   Chief Complaint   Patient presents with    Routine Prenatal Visit            Genesis Carver is a 24 y.o.  30w0d patient being seen today for her obstetrical follow up visit. Patient denies decreased fetal movement, contractions, loss of fluid or vaginal bleeding.  Was evaluated on labor and delivery last Thursday for pelvic discomfort.  Was diagnosed with urinary tract infection and given Macrobid.  She is finishing up antibiotic course.  No other acute complaints.    Her prenatal care is complicated by (and status) :    Patient Active Problem List   Diagnosis    Palpitations    Syncope    Supervision of other normal pregnancy, antepartum    Previous  delivery affecting pregnancy    Short interval between pregnancies affecting pregnancy, antepartum    Unwanted fertility       All other systems reviewed and are negative.     The additional following portions of the patient's history were reviewed and updated as appropriate: allergies, current medications, past family history, past medical history, past social history, past surgical history, and problem list.      EXAM:     Vital signs: /66   Wt 66.2 kg (146 lb)   LMP 2023   BMI 25.06 kg/m²   Appearance/psychiatric: To be in no distress  Constitutional: The patient is well nourished.  Cardiovascular: She does not have edema.  Respiratory: Respiratory effort is normal.  Gastrointestinal: Abdomen is soft, gravid, nontender, no rashes, heart tones are present, fundal height is size equals dates    Pelvic Exam: No    Urine glucose/protein: See prenatal flowsheet       Assessment and Plan    Problem List Items Addressed This Visit          Genitourinary and Reproductive     Unwanted fertility       Gravid and     Previous  delivery affecting pregnancy    Overview     x1  Desires repeat  Placenta: Posterior         Short interval between pregnancies affecting pregnancy, antepartum    Overview      Discussed the risks of short interval pregnancy.         Supervision of other normal pregnancy, antepartum - Primary    Overview     EDC: 10/1/2024    Prenatal genetic screening: Declined    Previous  delivery  Short interval pregnancy  The patient's first child had a tracheal hemangioma requiring surgical excision at 2 months of age         Relevant Orders    POC Urinalysis Dipstick (Completed)       Impression  Pregnancy at 30w0d  Fetal status reassuring.   Activity and Exercise discussed.    Plan  Tdap Rx provided  Continue with daily prenatal vitamin  Tubal paperwork previously signed  Return to office in 2 weeks      Patient was counseled to the following pregnancy precautions:  Decreased fetal movement, if concern for decreased fetal movement please perform fetal kick counts you are looking for 10 movements in 2 hours.  If concern for fetal movement and not meeting that criteria, please present to triage for evaluation.  Contractions occurring every 5 minutes for over an hour, lasting 30 to 60 seconds and progressively causing more discomfort, please seek medical attention to rule out labor  If you believe that your water is broken, place a sanitary pad.  If pad fills in short period of time i.e. less than 5 minutes, take off pad placed another pad.  If this is saturated please present for rule out rupture of membranes  Vaginal bleeding can be normal in pregnancy, this usually takes a form of spotting.  If having heavier bleeding like a menstrual period please present for evaluation; especially in light of severe abdominal pain this could represent a placental abruption.  Keep all scheduled appointments as recommended.        Deng Vines MD  2024

## 2024-08-07 ENCOUNTER — ROUTINE PRENATAL (OUTPATIENT)
Dept: OBSTETRICS AND GYNECOLOGY | Facility: CLINIC | Age: 24
End: 2024-08-07
Payer: COMMERCIAL

## 2024-08-07 VITALS — DIASTOLIC BLOOD PRESSURE: 69 MMHG | SYSTOLIC BLOOD PRESSURE: 110 MMHG | BODY MASS INDEX: 25.92 KG/M2 | WEIGHT: 151 LBS

## 2024-08-07 DIAGNOSIS — Z34.80 SUPERVISION OF OTHER NORMAL PREGNANCY, ANTEPARTUM: Primary | ICD-10-CM

## 2024-08-07 DIAGNOSIS — O34.219 PREVIOUS CESAREAN DELIVERY AFFECTING PREGNANCY: ICD-10-CM

## 2024-08-07 DIAGNOSIS — O09.899 SHORT INTERVAL BETWEEN PREGNANCIES AFFECTING PREGNANCY, ANTEPARTUM: ICD-10-CM

## 2024-08-07 DIAGNOSIS — O26.849 FETAL SIZE INCONSISTENT WITH DATES: ICD-10-CM

## 2024-08-07 LAB
GLUCOSE UR STRIP-MCNC: NEGATIVE MG/DL
PROT UR STRIP-MCNC: NEGATIVE MG/DL

## 2024-08-07 NOTE — PROGRESS NOTES
[facility]  OB Follow Up Visit    CC: Routine obstetrical visit    Prenatal care complicated by:  Patient Active Problem List   Diagnosis    Syncope    Supervision of other normal pregnancy, antepartum    Previous  delivery affecting pregnancy    Short interval between pregnancies affecting pregnancy, antepartum    Unwanted fertility     Subjective:   Genesis Carver is a 24 y.o.  32w1d patient being seen today for her obstetrical follow up visit. The patient has: No complaints, No leaking fluid, No vaginal bleeding, only occasional Brandie Erwin contractions, Adequate FM    History: Past medical and surgical history, medications, allergies, social history, and obstetrical history all reviewed and updated.    Objective:    Urine glucose/protein - See OB flow sheet      /69   Wt 68.5 kg (151 lb)   LMP 2023   BMI 25.92 kg/m²     General exam: Comfortable, NAD  FHR: 140 BPM   Uterine Size:  31 cm  Pelvic Exam: No    Assessment and Plan:  Diagnoses and all orders for this visit:    1. Supervision of other normal pregnancy, antepartum (Primary)  Overview:  EDC: 10/1/2024    Prenatal genetic screening: Declined    Previous  delivery  Short interval pregnancy  The patient's first child had a tracheal hemangioma requiring surgical excision at 2 months of age  2024 Tdap Rx provided     Assessment & Plan:  Continue prenatal vitamins  Fetal kick counts   labor warnings    Orders:  -     POC Urinalysis Dipstick    2. Fetal size inconsistent with dates  -     US Ob 14 + Weeks Single or First Gestation; Future    3. Short interval between pregnancies affecting pregnancy, antepartum  Overview:  Discussed the risks of short interval pregnancy.      4. Previous  delivery affecting pregnancy  Overview:  x1  Desires repeat  Placenta: Posterior        32w1d  Reassuring pregnancy progress    Counseling: OB precautions, leaking, VB, brandie erwin vs  PTL/Labor  [facility]    Questions answered    Return in about 2 weeks (around 8/21/2024) for Recheck.    Indio Soliz MD  08/07/2024

## 2024-08-20 ENCOUNTER — ROUTINE PRENATAL (OUTPATIENT)
Dept: OBSTETRICS AND GYNECOLOGY | Facility: CLINIC | Age: 24
End: 2024-08-20
Payer: COMMERCIAL

## 2024-08-20 VITALS — SYSTOLIC BLOOD PRESSURE: 115 MMHG | BODY MASS INDEX: 26.09 KG/M2 | WEIGHT: 152 LBS | DIASTOLIC BLOOD PRESSURE: 69 MMHG

## 2024-08-20 DIAGNOSIS — O34.219 PREVIOUS CESAREAN DELIVERY AFFECTING PREGNANCY: ICD-10-CM

## 2024-08-20 DIAGNOSIS — O09.899 SHORT INTERVAL BETWEEN PREGNANCIES AFFECTING PREGNANCY, ANTEPARTUM: ICD-10-CM

## 2024-08-20 DIAGNOSIS — Z34.80 SUPERVISION OF OTHER NORMAL PREGNANCY, ANTEPARTUM: Primary | ICD-10-CM

## 2024-08-20 DIAGNOSIS — Z30.09 UNWANTED FERTILITY: ICD-10-CM

## 2024-08-20 LAB
GLUCOSE UR STRIP-MCNC: NEGATIVE MG/DL
PROT UR STRIP-MCNC: NEGATIVE MG/DL

## 2024-08-20 NOTE — PROGRESS NOTES
[facility]  OB Follow Up Visit    CC: Routine obstetrical visit    Prenatal care complicated by:  Patient Active Problem List   Diagnosis    Syncope    Supervision of other normal pregnancy, antepartum    Previous  delivery affecting pregnancy    Short interval between pregnancies affecting pregnancy, antepartum    Unwanted fertility     Subjective:   Genesis Carver is a 24 y.o.  34w0d patient being seen today for her obstetrical follow up visit. The patient has: No complaints, No leaking fluid, No vaginal bleeding, No contractions, Adequate FM    History: Past medical and surgical history, medications, allergies, social history, and obstetrical history all reviewed and updated.    Objective:    Urine glucose/protein - See OB flow sheet      /69   Wt 68.9 kg (152 lb)   LMP 2023   BMI 26.09 kg/m²     General exam: Comfortable, NAD  FHR: 152 BPM   Uterine Size:  33 cm  Pelvic Exam: No    Assessment and Plan:  Diagnoses and all orders for this visit:    1. Supervision of other normal pregnancy, antepartum (Primary)  Overview:  EDC: 10/1/2024    Prenatal genetic screening: Declined    Previous  delivery  Short interval pregnancy  The patient's first child had a tracheal hemangioma requiring surgical excision at 2 months of age  2024 Tdap Rx provided     Assessment & Plan:  Continue prenatal vitamins  Fetal kick counts   labor warnings  GBS next office visit    Orders:  -     POC Urinalysis Dipstick    2. Short interval between pregnancies affecting pregnancy, antepartum  Overview:  Discussed the risks of short interval pregnancy.      3. Previous  delivery affecting pregnancy  Overview:  x1  Desires repeat  Placenta: Posterior      4. Unwanted fertility  Overview:  Consent signed 2024        34w0d  Reassuring pregnancy progress    Counseling: OB precautions, leaking, VB, brandie ayala vs PTL/Labor  [facility]    Questions answered    Return in about  2 weeks (around 9/3/2024) for Recheck.    Indio Soliz MD  08/20/2024

## 2024-09-05 ENCOUNTER — ROUTINE PRENATAL (OUTPATIENT)
Dept: OBSTETRICS AND GYNECOLOGY | Facility: CLINIC | Age: 24
End: 2024-09-05
Payer: COMMERCIAL

## 2024-09-05 VITALS — BODY MASS INDEX: 26.43 KG/M2 | WEIGHT: 154 LBS | DIASTOLIC BLOOD PRESSURE: 78 MMHG | SYSTOLIC BLOOD PRESSURE: 122 MMHG

## 2024-09-05 DIAGNOSIS — O34.219 PREVIOUS CESAREAN DELIVERY AFFECTING PREGNANCY: ICD-10-CM

## 2024-09-05 DIAGNOSIS — Z34.80 SUPERVISION OF OTHER NORMAL PREGNANCY, ANTEPARTUM: Primary | ICD-10-CM

## 2024-09-05 DIAGNOSIS — Z30.09 UNWANTED FERTILITY: ICD-10-CM

## 2024-09-05 DIAGNOSIS — O09.899 SHORT INTERVAL BETWEEN PREGNANCIES AFFECTING PREGNANCY, ANTEPARTUM: ICD-10-CM

## 2024-09-05 LAB
DEPRECATED RDW RBC AUTO: 37.8 FL (ref 37–54)
ERYTHROCYTE [DISTWIDTH] IN BLOOD BY AUTOMATED COUNT: 12.5 % (ref 12.3–15.4)
GLUCOSE UR STRIP-MCNC: NEGATIVE MG/DL
HCT VFR BLD AUTO: 34.9 % (ref 34–46.6)
HGB BLD-MCNC: 11.5 G/DL (ref 12–15.9)
MCH RBC QN AUTO: 27.6 PG (ref 26.6–33)
MCHC RBC AUTO-ENTMCNC: 33 G/DL (ref 31.5–35.7)
MCV RBC AUTO: 83.7 FL (ref 79–97)
PLATELET # BLD AUTO: 202 10*3/MM3 (ref 140–450)
PMV BLD AUTO: 10.9 FL (ref 6–12)
PROT UR STRIP-MCNC: NEGATIVE MG/DL
RBC # BLD AUTO: 4.17 10*6/MM3 (ref 3.77–5.28)
WBC NRBC COR # BLD AUTO: 10.36 10*3/MM3 (ref 3.4–10.8)

## 2024-09-05 PROCEDURE — 87653 STREP B DNA AMP PROBE: CPT | Performed by: OBSTETRICS & GYNECOLOGY

## 2024-09-05 PROCEDURE — 85027 COMPLETE CBC AUTOMATED: CPT | Performed by: OBSTETRICS & GYNECOLOGY

## 2024-09-05 PROCEDURE — 87086 URINE CULTURE/COLONY COUNT: CPT | Performed by: OBSTETRICS & GYNECOLOGY

## 2024-09-06 LAB — GROUP B STREP, DNA: POSITIVE

## 2024-09-07 LAB — BACTERIA SPEC AEROBE CULT: NORMAL

## 2024-09-11 ENCOUNTER — ROUTINE PRENATAL (OUTPATIENT)
Dept: OBSTETRICS AND GYNECOLOGY | Facility: CLINIC | Age: 24
End: 2024-09-11
Payer: COMMERCIAL

## 2024-09-11 VITALS — BODY MASS INDEX: 26.61 KG/M2 | WEIGHT: 155 LBS | SYSTOLIC BLOOD PRESSURE: 121 MMHG | DIASTOLIC BLOOD PRESSURE: 76 MMHG

## 2024-09-11 DIAGNOSIS — O34.219 PREVIOUS CESAREAN DELIVERY AFFECTING PREGNANCY: ICD-10-CM

## 2024-09-11 DIAGNOSIS — O09.899 SHORT INTERVAL BETWEEN PREGNANCIES AFFECTING PREGNANCY, ANTEPARTUM: ICD-10-CM

## 2024-09-11 DIAGNOSIS — Z30.09 UNWANTED FERTILITY: ICD-10-CM

## 2024-09-11 DIAGNOSIS — Z34.80 SUPERVISION OF OTHER NORMAL PREGNANCY, ANTEPARTUM: Primary | ICD-10-CM

## 2024-09-11 LAB
GLUCOSE UR STRIP-MCNC: NEGATIVE MG/DL
PROT UR STRIP-MCNC: NEGATIVE MG/DL

## 2024-09-11 NOTE — PROGRESS NOTES
[facility]  OB Follow Up Visit    CC: Routine obstetrical visit    Prenatal care complicated by:  Patient Active Problem List   Diagnosis    Syncope    Supervision of other normal pregnancy, antepartum    Previous  delivery affecting pregnancy    Short interval between pregnancies affecting pregnancy, antepartum    Unwanted fertility     Subjective:   Genesis Carver is a 24 y.o.  37w1d patient being seen today for her obstetrical follow up visit. The patient has: No complaints, No leaking fluid, No vaginal bleeding, Adequate FM  Reports irregular contractions    History: Past medical and surgical history, medications, allergies, social history, and obstetrical history all reviewed and updated.    Objective:    Urine glucose/protein - See OB flow sheet      /76   Wt 70.3 kg (155 lb)   LMP 2023   BMI 26.61 kg/m²     General exam: Comfortable, NAD  FHR: 156 BPM   Uterine Size:  37 cm  Pelvic Exam: No    Assessment and Plan:  Diagnoses and all orders for this visit:    1. Supervision of other normal pregnancy, antepartum (Primary)  Overview:  EDC: 10/1/2024    Prenatal genetic screening: Declined    Previous  delivery  Short interval pregnancy  The patient's first child had a tracheal hemangioma requiring surgical excision at 2 months of age  2024 Tdap Rx provided     Assessment & Plan:  Continue prenatal vitamins  Fetal kick counts  Labor instructions    Orders:  -     POC Urinalysis Dipstick    2. Short interval between pregnancies affecting pregnancy, antepartum  Overview:  Discussed the risks of short interval pregnancy.      3. Previous  delivery affecting pregnancy  Overview:  x1  Desires repeat  Placenta: Posterior      4. Unwanted fertility  Overview:  Consent signed 2024        37w1d  Reassuring pregnancy progress    Counseling: OB precautions, leaking, VB, brandie ayala vs PTL/Labor  [facility]    Questions answered    Return in about 1 week  (around 9/18/2024) for Recheck.    Indio Soliz MD  09/11/2024

## 2024-09-15 ENCOUNTER — HOSPITAL ENCOUNTER (OUTPATIENT)
Facility: HOSPITAL | Age: 24
Setting detail: SURGERY ADMIT
Discharge: HOME | End: 2024-09-15
Attending: OBSTETRICS & GYNECOLOGY | Admitting: OBSTETRICS & GYNECOLOGY
Payer: COMMERCIAL

## 2024-09-15 VITALS
DIASTOLIC BLOOD PRESSURE: 79 MMHG | SYSTOLIC BLOOD PRESSURE: 116 MMHG | OXYGEN SATURATION: 100 % | RESPIRATION RATE: 16 BRPM | HEART RATE: 122 BPM

## 2024-09-15 LAB
A1 MICROGLOB PLACENTAL VAG QL: NEGATIVE
BILIRUB BLD-MCNC: NEGATIVE MG/DL
CLARITY, POC: CLEAR
COLOR UR: YELLOW
GLUCOSE UR STRIP-MCNC: ABNORMAL MG/DL
KETONES UR QL: NEGATIVE
LEUKOCYTE EST, POC: NEGATIVE
NITRITE UR-MCNC: NEGATIVE MG/ML
PH UR: 7 [PH] (ref 5–8)
PROT UR STRIP-MCNC: NEGATIVE MG/DL
RBC # UR STRIP: NEGATIVE /UL
SP GR UR: 1.01 (ref 1–1.03)
UROBILINOGEN UR QL: ABNORMAL

## 2024-09-15 PROCEDURE — 81002 URINALYSIS NONAUTO W/O SCOPE: CPT | Performed by: OBSTETRICS & GYNECOLOGY

## 2024-09-15 PROCEDURE — 59025 FETAL NON-STRESS TEST: CPT

## 2024-09-15 PROCEDURE — G0463 HOSPITAL OUTPT CLINIC VISIT: HCPCS

## 2024-09-15 PROCEDURE — 84112 EVAL AMNIOTIC FLUID PROTEIN: CPT | Performed by: OBSTETRICS & GYNECOLOGY

## 2024-09-15 NOTE — OBED NOTES
"AKIL Funk  Obstetric History and Physical    Chief Complaint   Patient presents with    Leaking Fluid     \"gush of fluid at 2200\"       Subjective     Patient is a 24 y.o. female  currently at 37w5d, who presents with complaint of leakage of fluid.  She denies any vaginal bleeding.  Positive fetal movements.    PNC provided by:  [facility] [place]. Her prenatal care is benign.  Her previous obstetric/gynecological history is noted for is non-contributory.    The following portions of the patients history were reviewed and updated as appropriate: current medications, allergies, past medical history, past surgical history, past family history, past social history, and problem list .       Prenatal Information:  Prenatal Results       Initial Prenatal Labs       Test Value Reference Range Date Time    Hemoglobin  12.7 g/dL 12.0 - 15.9 24 1045    Hematocrit  38.3 % 34.0 - 46.6 24 1045    Platelets  255 10*3/mm3 140 - 450 24 1045    Rubella IgG  <0.90 index Immune >0.99 24 1045    Hepatitis B SAg  Non-Reactive  Non-Reactive 24 1045    Hepatitis C Ab  Non-Reactive  Non-Reactive 24 1045    RPR        T. Pallidum Ab   Non-Reactive  Non-Reactive 24 1045    ABO  B   24 1045    Rh  Positive   24 1045    Antibody Screen  Negative   24 1045    HIV  Non-Reactive  Non-Reactive 24 1045    Urine Culture  50,000 CFU/mL Normal Urogenital Priscilla   24 1358       >100,000 CFU/mL Normal Urogenital Priscilla   24 1431       25,000 CFU/mL Normal Urogenital Priscilla   24 1036    Gonorrhea  Negative  Negative 24 1036    Chlamydia  Negative  Negative 24 1036    TSH        HgB A1c         Varicella IgG        Hemoglobinopathy Fractionation  Comment   22 1414    Hemoglobinopathy (genetic testing)        Cystic fibrosis                   Fetal testing        Test Value Reference Range Date Time    NIPT        MSAFP        AFP-4                  2nd and 3rd " Trimester       Test Value Reference Range Date Time    Hemoglobin (repeated)  11.5 g/dL 12.0 - 15.9 09/05/24 1440       11.9 g/dL 12.0 - 15.9 06/12/24 1501    Hematocrit (repeated)  34.9 % 34.0 - 46.6 09/05/24 1440       36.5 % 34.0 - 46.6 06/12/24 1501    Platelets   202 10*3/mm3 140 - 450 09/05/24 1440       254 10*3/mm3 140 - 450 06/12/24 1501       255 10*3/mm3 140 - 450 03/19/24 1045    1 hour GTT   110 mg/dL 65 - 139 06/12/24 1501    Antibody Screen (repeated)        3rd TM syphilis scrn (repeated)  RPR         3rd TM syphilis scrn (repeated) TP-Ab        3rd TM syphilis screen TB-Ab (FTA)        Syphilis cascade test TP-Ab (EIA)        Syphilis cascade TPPA        GTT Fasting        GTT 1 Hr        GTT 2 Hr        GTT 3 Hr        Group B Strep  Positive  Negative 09/05/24 1358              Other testing        Test Value Reference Range Date Time    Parvo IgG         CMV IgG                   Drug Screening       Test Value Reference Range Date Time    Amphetamine Screen        Barbiturate Screen  Negative  Negative 03/19/24 1036    Benzodiazepine Screen  Negative  Negative 03/19/24 1036    Methadone Screen  Negative  Negative 03/19/24 1036    Phencyclidine Screen        Opiates Screen  Negative  Negative 03/19/24 1036    THC Screen  Negative  Negative 03/19/24 1036    Cocaine Screen  Negative  Negative 03/19/24 1036    Propoxyphene Screen        Buprenorphine Screen        Methamphetamine Screen        Oxycodone Screen  Negative  Negative 03/19/24 1036    Tricyclic Antidepressants Screen                  Legend    ^: Historical                          External Prenatal Results       Pregnancy Outside Results - Transcribed From Office Records - See Scanned Records For Details       Test Value Date Time    ABO  B  03/19/24 1045    Rh  Positive  03/19/24 1045    Antibody Screen  Negative  03/19/24 1045    Varicella IgG       Rubella  <0.90 index 03/19/24 1045    Hgb  11.5 g/dL 09/05/24 1440       11.9 g/dL  24 1501       12.7 g/dL 24 1045    Hct  34.9 % 24 1440       36.5 % 24 1501       38.3 % 24 1045    HgB A1c        1h GTT  110 mg/dL 24 1501    3h GTT Fasting       3h GTT 1 hour       3h GTT 2 hour       3h GTT 3 hour        Gonorrhea (discrete)  Negative  24 1036    Chlamydia (discrete)  Negative  24 1036    RPR       Syphils cascade: TP-Ab (FTA)  Non-Reactive  24 1045    TP-Ab       TP-Ab (EIA)       TPPA       HBsAg  Non-Reactive  24 1045    Herpes Simplex Virus PCR       Herpes Simplex VIrus Culture       HIV  Non-Reactive  24 1045    Hep C RNA Quant PCR       Hep C Antibody  Non-Reactive  24 1045    AFP       NIPT       Cystic Fibroisis        Group B Strep  Positive  24 1358    GBS Susceptibility to Clindamycin       GBS Susceptibility to Erythromycin       Fetal Fibronectin       Genetic Testing, Maternal Blood                 Drug Screening       Test Value Date Time    Urine Drug Screen       Amphetamine Screen       Barbiturate Screen  Negative  24 1036    Benzodiazepine Screen  Negative  24 1036    Methadone Screen  Negative  24 1036    Phencyclidine Screen       Opiates Screen  Negative  24 1036    THC Screen  Negative  24 1036    Cocaine Screen       Propoxyphene Screen       Buprenorphine Screen       Methamphetamine Screen       Oxycodone Screen  Negative  24 1036    Tricyclic Antidepressants Screen                 Legend    ^: Historical                             Past OB History:     OB History    Para Term  AB Living   3 1 1 0 1 1   SAB IAB Ectopic Molar Multiple Live Births   1 0 0 0 0 1      # Outcome Date GA Lbr Govind/2nd Weight Sex Type Anes PTL Lv   3 Current            2 Term 23 37w1d 23:20 / 03:01 2760 g (6 lb 1.4 oz) M CS-LTranv EPI N CONSTANCE      Complications: Intraamniotic Infection, Failure to Progress in Second Stage      Name: ODILIA BEJARANO      Apgar1: 8   Apgar5: 9   1 SAB                Past Medical History: Past Medical History:   Diagnosis Date    Complete miscarriage 2022    Palpitations 2021    Reflex tachycardia     Single delivery by  2023      Past Surgical History Past Surgical History:   Procedure Laterality Date     SECTION N/A 2023    Procedure:  SECTION PRIMARY;  Surgeon: Taryn Beckett DO;  Location: [facility] LABOR DELIVERY;  Service: Gynecology;  Laterality: N/A;      Family History: Family History   Problem Relation Age of Onset    No Known Problems Father     Breast cancer Neg Hx     Ovarian cancer Neg Hx     Uterine cancer Neg Hx     Colon cancer Neg Hx     Melanoma Neg Hx     Prostate cancer Neg Hx       Social History:  reports that she has never smoked. She has never been exposed to tobacco smoke. She has never used smokeless tobacco.   reports no history of alcohol use.   reports no history of drug use.        General ROS: Pertinent items are noted in HPI    Objective       Vital Signs Range for the last 24 hours  Temperature:     Temp Source:     BP: BP: (116-117)/(72-79) 116/79   Pulse: Heart Rate:  [] 122   Respirations: Resp:  [16-18] 16   SPO2: SpO2:  [99 %-100 %] 100 %   O2 Amount (l/min):     O2 Devices Device (Oxygen Therapy): room air   Weight:       Physical Examination: General appearance - alert, well appearing, and in no distress  Mental status - alert, oriented to person, place, and time  Chest - clear to auscultation, no wheezes, rales or rhonchi, symmetric air entry  Heart - normal rate, regular rhythm, normal S1, S2, no murmurs, rubs, clicks or gallops  Abdomen - soft, nontender, gravid  Extremities - peripheral pulses normal, no pedal edema, no clubbing or cyanosis  Skin - normal coloration and turgor, no rashes, no suspicious skin lesions noted    Presentation: Unknown   Cervix: Exam by: Method: sterile exam per RN   Dilation: Cervical Dilation (cm): 0   Effacement:      Station:         Fetal Heart Rate Assessment   Method: Fetal HR Assessment Method: external   Beats/min: Fetal HR (beats/min): 145   Baseline: Fetal HR Baseline: normal range   Variability: Fetal HR Variability: moderate (amplitude range 6 to 25 bpm)   Accels: Fetal HR Accelerations: greater than/equal to 10 bpm (32 wks gest or less), lasts at least 10 seconds (32 wks gest or less)   Decels: Fetal HR Decelerations: absent         Uterine Assessment   Method: Method: palpation, external tocotransducer   Frequency (min):     Ctx Count in 10 min: Contractions in 10 Minutes: 1   Duration:     Intensity: Contraction Intensity: mild by palpation       Shade Units:       GBS is positive      Assessment & Plan     Leakage of fluid      Assessment:  1.  Intrauterine pregnancy at 37w5d gestation with reactive fetal status.    2.  IUP at 37 weeks estimate gestational age with false labor.  There is no evidence for rupture membranes at this time.      Plan:  Discharge home  Fetal kick count  Labor precautions      Henrik Villalobos MD  9/15/2024  06:51 EDT

## 2024-09-15 NOTE — NURSING NOTE
Verbal discharge order received. Discharge instruction sheet printed, reviewed. States no questions regarding medications at this time. Labor precautions reviewed. Instructed to keep next scheduled appointment in office with Dr. Soliz 9/17/24. Third trimester education printed, reviewed, sent home with patient. Pt verbalizes understanding. Patient ambulatory, undelivered, in stable condition upon discharge home.

## 2024-09-15 NOTE — NURSING NOTE
"MD notified of patient arrival.  at 37w5d. Presents with SROM, perceived. States she felt \"a gush of fluid at 2200, 24. Fetal Tracing, Reactive. Occasional contraction noted. Amnisure collected, Negative. SVE, Closed. VS, WNL. Urine dip results reviewed, entered into system for MD viewing. MD to see patient for evaluation, discuss POC.  "

## 2024-09-15 NOTE — NON STRESS TEST
Obstetrical Non-stress Test Interpretation     Name:  Genesis Carver  MRN: [id number]    24 y.o. female  at 37w5d    Indication: Leaking of Fluid      Fetal Assessment  Fetal Movement: active  Fetal HR Assessment Method: external  Fetal HR (beats/min): 145  Fetal HR Baseline: normal range  Fetal HR Variability: moderate (amplitude range 6 to 25 bpm)  Fetal HR Accelerations: greater than/equal to 10 bpm (32 wks gest or less), lasts at least 10 seconds (32 wks gest or less)  Fetal HR Decelerations: absent  Sinusoidal Pattern Present: absent    /79 (BP Location: Right arm, Patient Position: Sitting)   Pulse (!) 122   Resp 16   LMP 2023   SpO2 100%     Reason for test: Leaking of Fluid  Date of Test: 9/15/2024  Time frame of test: 7375-7717  RN NST Interpretation: Reactive      Gloria Quintana RN  9/15/2024  01:50 EDT

## 2024-09-18 ENCOUNTER — ROUTINE PRENATAL (OUTPATIENT)
Dept: OBSTETRICS AND GYNECOLOGY | Facility: CLINIC | Age: 24
End: 2024-09-18
Payer: COMMERCIAL

## 2024-09-18 VITALS — SYSTOLIC BLOOD PRESSURE: 112 MMHG | DIASTOLIC BLOOD PRESSURE: 71 MMHG | BODY MASS INDEX: 26.78 KG/M2 | WEIGHT: 156 LBS

## 2024-09-18 DIAGNOSIS — O09.899 SHORT INTERVAL BETWEEN PREGNANCIES AFFECTING PREGNANCY, ANTEPARTUM: ICD-10-CM

## 2024-09-18 DIAGNOSIS — Z30.09 UNWANTED FERTILITY: ICD-10-CM

## 2024-09-18 DIAGNOSIS — Z34.80 SUPERVISION OF OTHER NORMAL PREGNANCY, ANTEPARTUM: Primary | ICD-10-CM

## 2024-09-18 DIAGNOSIS — O34.219 PREVIOUS CESAREAN DELIVERY AFFECTING PREGNANCY: ICD-10-CM

## 2024-09-18 LAB
GLUCOSE UR STRIP-MCNC: NEGATIVE MG/DL
PROT UR STRIP-MCNC: NEGATIVE MG/DL

## 2024-09-24 ENCOUNTER — ANESTHESIA (OUTPATIENT)
Dept: LABOR AND DELIVERY | Facility: HOSPITAL | Age: 24
End: 2024-09-24
Payer: COMMERCIAL

## 2024-09-24 ENCOUNTER — HOSPITAL ENCOUNTER (INPATIENT)
Facility: HOSPITAL | Age: 24
LOS: 2 days | Discharge: HOME | End: 2024-09-26
Attending: OBSTETRICS & GYNECOLOGY | Admitting: OBSTETRICS & GYNECOLOGY
Payer: COMMERCIAL

## 2024-09-24 ENCOUNTER — ANESTHESIA EVENT (OUTPATIENT)
Dept: LABOR AND DELIVERY | Facility: HOSPITAL | Age: 24
End: 2024-09-24
Payer: COMMERCIAL

## 2024-09-24 DIAGNOSIS — Z90.79 H/O BILATERAL SALPINGECTOMY: ICD-10-CM

## 2024-09-24 PROBLEM — Z34.80 SUPERVISION OF OTHER NORMAL PREGNANCY, ANTEPARTUM: Status: RESOLVED | Noted: 2024-03-19 | Resolved: 2024-09-24

## 2024-09-24 LAB
ABO GROUP BLD: NORMAL
AMPHET+METHAMPHET UR QL: NEGATIVE
ARTERIAL PATENCY WRIST A: ABNORMAL
ATMOSPHERIC PRESS: 738.6 MMHG
ATMOSPHERIC PRESS: 741 MMHG
BARBITURATES UR QL SCN: NEGATIVE
BASE EXCESS BLDA CALC-SCNC: -3.5 MMOL/L (ref -2–2)
BASE EXCESS BLDV CALC-SCNC: -4 MMOL/L (ref -2–2)
BASOPHILS # BLD AUTO: 0.02 10*3/MM3 (ref 0–0.2)
BASOPHILS NFR BLD AUTO: 0.2 % (ref 0–1.5)
BDY SITE: ABNORMAL
BENZODIAZ UR QL SCN: NEGATIVE
BLD GP AB SCN SERPL QL: NEGATIVE
CANNABINOIDS SERPL QL: NEGATIVE
COCAINE UR QL: NEGATIVE
DEPRECATED RDW RBC AUTO: 37.9 FL (ref 37–54)
EOSINOPHIL # BLD AUTO: 0.05 10*3/MM3 (ref 0–0.4)
EOSINOPHIL NFR BLD AUTO: 0.5 % (ref 0.3–6.2)
ERYTHROCYTE [DISTWIDTH] IN BLOOD BY AUTOMATED COUNT: 13.1 % (ref 12.3–15.4)
FENTANYL UR-MCNC: NEGATIVE NG/ML
HCO3 BLDA-SCNC: 23.3 MMOL/L (ref 22–26)
HCO3 BLDV-SCNC: 22 MMOL/L (ref 22–26)
HCT VFR BLD AUTO: 35.5 % (ref 34–46.6)
HCT VFR BLD CALC: 44 % (ref 38–51)
HEMODILUTION: NO
HGB BLD-MCNC: 11.5 G/DL (ref 12–15.9)
HGB BLDA-MCNC: 15 G/DL (ref 12–18)
HGB BLDA-MCNC: 15.9 G/DL (ref 12–18)
IMM GRANULOCYTES # BLD AUTO: 0.13 10*3/MM3 (ref 0–0.05)
IMM GRANULOCYTES NFR BLD AUTO: 1.3 % (ref 0–0.5)
LYMPHOCYTES # BLD AUTO: 2.95 10*3/MM3 (ref 0.7–3.1)
LYMPHOCYTES NFR BLD AUTO: 28.4 % (ref 19.6–45.3)
Lab: ABNORMAL
MCH RBC QN AUTO: 26.2 PG (ref 26.6–33)
MCHC RBC AUTO-ENTMCNC: 32.4 G/DL (ref 31.5–35.7)
MCV RBC AUTO: 80.9 FL (ref 79–97)
METHADONE UR QL SCN: NEGATIVE
MODALITY: ABNORMAL
MODALITY: ABNORMAL
MONOCYTES # BLD AUTO: 0.85 10*3/MM3 (ref 0.1–0.9)
MONOCYTES NFR BLD AUTO: 8.2 % (ref 5–12)
NEUTROPHILS NFR BLD AUTO: 6.4 10*3/MM3 (ref 1.7–7)
NEUTROPHILS NFR BLD AUTO: 61.4 % (ref 42.7–76)
NOTIFIED WHO: ABNORMAL
NOTIFIED WHO: ABNORMAL
NRBC BLD AUTO-RTO: 0 /100 WBC (ref 0–0.2)
OPIATES UR QL: NEGATIVE
OXYCODONE UR QL SCN: NEGATIVE
PCO2 BLDA: 47.4 MM HG (ref 35–45)
PCO2 BLDV: 42.3 MM HG (ref 41–51)
PH BLDA: 7.3 PH UNITS (ref 7.35–7.45)
PH BLDV: 7.32 PH UNITS (ref 7.31–7.41)
PLATELET # BLD AUTO: 227 10*3/MM3 (ref 140–450)
PMV BLD AUTO: 10.8 FL (ref 6–12)
PO2 BLDA: 16.3 MM HG (ref 80–100)
PO2 BLDV: 20.3 MM HG (ref 35–42)
RBC # BLD AUTO: 4.39 10*6/MM3 (ref 3.77–5.28)
READ BACK: YES
RH BLD: POSITIVE
SAO2 % BLDCOA: 18.5 % (ref 95–99)
SAO2 % BLDCOV: 28.8 % (ref 45–75)
T&S EXPIRATION DATE: NORMAL
TREPONEMA PALLIDUM IGG+IGM AB [PRESENCE] IN SERUM OR PLASMA BY IMMUNOASSAY: NORMAL
WBC NRBC COR # BLD AUTO: 10.4 10*3/MM3 (ref 3.4–10.8)

## 2024-09-24 PROCEDURE — 80307 DRUG TEST PRSMV CHEM ANLYZR: CPT | Performed by: OBSTETRICS & GYNECOLOGY

## 2024-09-24 PROCEDURE — 88302 TISSUE EXAM BY PATHOLOGIST: CPT | Performed by: OBSTETRICS & GYNECOLOGY

## 2024-09-24 PROCEDURE — 25010000002 OXYTOCIN PER 10 UNITS: Performed by: REGISTERED NURSE

## 2024-09-24 PROCEDURE — 25810000003 LACTATED RINGERS SOLUTION: Performed by: OBSTETRICS & GYNECOLOGY

## 2024-09-24 PROCEDURE — 86901 BLOOD TYPING SEROLOGIC RH(D): CPT | Performed by: OBSTETRICS & GYNECOLOGY

## 2024-09-24 PROCEDURE — 51702 INSERT TEMP BLADDER CATH: CPT

## 2024-09-24 PROCEDURE — 25010000002 MORPHINE PER 10 MG: Performed by: REGISTERED NURSE

## 2024-09-24 PROCEDURE — 82803 BLOOD GASES ANY COMBINATION: CPT

## 2024-09-24 PROCEDURE — 85025 COMPLETE CBC W/AUTO DIFF WBC: CPT | Performed by: OBSTETRICS & GYNECOLOGY

## 2024-09-24 PROCEDURE — 25010000002 KETOROLAC TROMETHAMINE PER 15 MG: Performed by: OBSTETRICS & GYNECOLOGY

## 2024-09-24 PROCEDURE — 25010000002 CEFAZOLIN PER 500 MG: Performed by: OBSTETRICS & GYNECOLOGY

## 2024-09-24 PROCEDURE — 63710000001 DIPHENHYDRAMINE PER 50 MG: Performed by: REGISTERED NURSE

## 2024-09-24 PROCEDURE — S0260 H&P FOR SURGERY: HCPCS | Performed by: OBSTETRICS & GYNECOLOGY

## 2024-09-24 PROCEDURE — 36600 WITHDRAWAL OF ARTERIAL BLOOD: CPT

## 2024-09-24 PROCEDURE — 0UB70ZZ EXCISION OF BILATERAL FALLOPIAN TUBES, OPEN APPROACH: ICD-10-PCS | Performed by: OBSTETRICS & GYNECOLOGY

## 2024-09-24 PROCEDURE — 25810000003 LACTATED RINGERS PER 1000 ML: Performed by: OBSTETRICS & GYNECOLOGY

## 2024-09-24 PROCEDURE — 25010000002 ONDANSETRON PER 1 MG: Performed by: REGISTERED NURSE

## 2024-09-24 PROCEDURE — 58611 LIGATE OVIDUCT(S) ADD-ON: CPT | Performed by: OBSTETRICS & GYNECOLOGY

## 2024-09-24 PROCEDURE — 59510 CESAREAN DELIVERY: CPT | Performed by: OBSTETRICS & GYNECOLOGY

## 2024-09-24 PROCEDURE — 25810000003 LACTATED RINGERS PER 1000 ML: Performed by: REGISTERED NURSE

## 2024-09-24 PROCEDURE — 25010000002 BUPIVACAINE PF 0.75 % SOLUTION: Performed by: REGISTERED NURSE

## 2024-09-24 PROCEDURE — 86900 BLOOD TYPING SEROLOGIC ABO: CPT | Performed by: OBSTETRICS & GYNECOLOGY

## 2024-09-24 PROCEDURE — 59025 FETAL NON-STRESS TEST: CPT

## 2024-09-24 PROCEDURE — 86780 TREPONEMA PALLIDUM: CPT | Performed by: OBSTETRICS & GYNECOLOGY

## 2024-09-24 PROCEDURE — 86850 RBC ANTIBODY SCREEN: CPT | Performed by: OBSTETRICS & GYNECOLOGY

## 2024-09-24 RX ORDER — PHENYLEPHRINE HCL IN 0.9% NACL 1 MG/10 ML
SYRINGE (ML) INTRAVENOUS AS NEEDED
Status: DISCONTINUED | OUTPATIENT
Start: 2024-09-24 | End: 2024-09-24 | Stop reason: SURG

## 2024-09-24 RX ORDER — HYDROMORPHONE HYDROCHLORIDE 2 MG/ML
0.5 INJECTION, SOLUTION INTRAMUSCULAR; INTRAVENOUS; SUBCUTANEOUS
Status: DISCONTINUED | OUTPATIENT
Start: 2024-09-24 | End: 2024-09-26 | Stop reason: HOSPADM

## 2024-09-24 RX ORDER — ONDANSETRON 4 MG/1
4 TABLET, ORALLY DISINTEGRATING ORAL EVERY 6 HOURS PRN
Status: DISCONTINUED | OUTPATIENT
Start: 2024-09-24 | End: 2024-09-24 | Stop reason: HOSPADM

## 2024-09-24 RX ORDER — KETOROLAC TROMETHAMINE 15 MG/ML
15 INJECTION, SOLUTION INTRAMUSCULAR; INTRAVENOUS EVERY 6 HOURS
Status: COMPLETED | OUTPATIENT
Start: 2024-09-24 | End: 2024-09-25

## 2024-09-24 RX ORDER — ACETAMINOPHEN 325 MG/1
650 TABLET ORAL EVERY 6 HOURS
Status: DISCONTINUED | OUTPATIENT
Start: 2024-09-25 | End: 2024-09-26 | Stop reason: HOSPADM

## 2024-09-24 RX ORDER — BUPIVACAINE HYDROCHLORIDE 7.5 MG/ML
INJECTION, SOLUTION EPIDURAL; RETROBULBAR
Status: COMPLETED | OUTPATIENT
Start: 2024-09-24 | End: 2024-09-24

## 2024-09-24 RX ORDER — SODIUM CHLORIDE 9 MG/ML
40 INJECTION, SOLUTION INTRAVENOUS AS NEEDED
Status: DISCONTINUED | OUTPATIENT
Start: 2024-09-24 | End: 2024-09-24 | Stop reason: HOSPADM

## 2024-09-24 RX ORDER — IBUPROFEN 600 MG/1
600 TABLET, FILM COATED ORAL EVERY 6 HOURS
Status: DISCONTINUED | OUTPATIENT
Start: 2024-09-25 | End: 2024-09-26 | Stop reason: HOSPADM

## 2024-09-24 RX ORDER — ALUMINA, MAGNESIA, AND SIMETHICONE 2400; 2400; 240 MG/30ML; MG/30ML; MG/30ML
15 SUSPENSION ORAL EVERY 4 HOURS PRN
Status: DISCONTINUED | OUTPATIENT
Start: 2024-09-24 | End: 2024-09-26 | Stop reason: HOSPADM

## 2024-09-24 RX ORDER — EPHEDRINE SULFATE 50 MG/ML
INJECTION, SOLUTION INTRAVENOUS AS NEEDED
Status: DISCONTINUED | OUTPATIENT
Start: 2024-09-24 | End: 2024-09-24 | Stop reason: SURG

## 2024-09-24 RX ORDER — DIPHENHYDRAMINE HCL 25 MG
25 CAPSULE ORAL EVERY 6 HOURS PRN
Status: DISPENSED | OUTPATIENT
Start: 2024-09-24 | End: 2024-09-25

## 2024-09-24 RX ORDER — OXYTOCIN/0.9 % SODIUM CHLORIDE 30/500 ML
125 PLASTIC BAG, INJECTION (ML) INTRAVENOUS ONCE
Status: COMPLETED | OUTPATIENT
Start: 2024-09-24 | End: 2024-09-24

## 2024-09-24 RX ORDER — FAMOTIDINE 20 MG/1
20 TABLET, FILM COATED ORAL ONCE AS NEEDED
Status: DISCONTINUED | OUTPATIENT
Start: 2024-09-24 | End: 2024-09-24 | Stop reason: HOSPADM

## 2024-09-24 RX ORDER — SODIUM CHLORIDE 0.9 % (FLUSH) 0.9 %
10 SYRINGE (ML) INJECTION AS NEEDED
Status: DISCONTINUED | OUTPATIENT
Start: 2024-09-24 | End: 2024-09-24 | Stop reason: HOSPADM

## 2024-09-24 RX ORDER — ACETAMINOPHEN 500 MG
1000 TABLET ORAL EVERY 6 HOURS
Status: COMPLETED | OUTPATIENT
Start: 2024-09-24 | End: 2024-09-25

## 2024-09-24 RX ORDER — SODIUM CHLORIDE, SODIUM LACTATE, POTASSIUM CHLORIDE, CALCIUM CHLORIDE 600; 310; 30; 20 MG/100ML; MG/100ML; MG/100ML; MG/100ML
INJECTION, SOLUTION INTRAVENOUS CONTINUOUS PRN
Status: DISCONTINUED | OUTPATIENT
Start: 2024-09-24 | End: 2024-09-24 | Stop reason: SURG

## 2024-09-24 RX ORDER — OXYCODONE HYDROCHLORIDE 5 MG/1
5 TABLET ORAL EVERY 4 HOURS PRN
Status: DISCONTINUED | OUTPATIENT
Start: 2024-09-24 | End: 2024-09-26 | Stop reason: HOSPADM

## 2024-09-24 RX ORDER — DEXMEDETOMIDINE HYDROCHLORIDE 100 UG/ML
INJECTION, SOLUTION INTRAVENOUS AS NEEDED
Status: DISCONTINUED | OUTPATIENT
Start: 2024-09-24 | End: 2024-09-24 | Stop reason: SURG

## 2024-09-24 RX ORDER — NALOXONE HCL 0.4 MG/ML
0.4 VIAL (ML) INJECTION ONCE AS NEEDED
Status: ACTIVE | OUTPATIENT
Start: 2024-09-24 | End: 2024-09-25

## 2024-09-24 RX ORDER — ONDANSETRON 2 MG/ML
4 INJECTION INTRAMUSCULAR; INTRAVENOUS EVERY 6 HOURS PRN
Status: ACTIVE | OUTPATIENT
Start: 2024-09-24 | End: 2024-09-25

## 2024-09-24 RX ORDER — FAMOTIDINE 10 MG/ML
INJECTION, SOLUTION INTRAVENOUS
Status: COMPLETED
Start: 2024-09-24 | End: 2024-09-24

## 2024-09-24 RX ORDER — OXYTOCIN/0.9 % SODIUM CHLORIDE 30/500 ML
125 PLASTIC BAG, INJECTION (ML) INTRAVENOUS ONCE AS NEEDED
Status: DISCONTINUED | OUTPATIENT
Start: 2024-09-24 | End: 2024-09-26 | Stop reason: HOSPADM

## 2024-09-24 RX ORDER — ONDANSETRON 2 MG/ML
INJECTION INTRAMUSCULAR; INTRAVENOUS AS NEEDED
Status: DISCONTINUED | OUTPATIENT
Start: 2024-09-24 | End: 2024-09-24 | Stop reason: SURG

## 2024-09-24 RX ORDER — METHYLERGONOVINE MALEATE 0.2 MG/ML
200 INJECTION INTRAVENOUS ONCE AS NEEDED
Status: DISCONTINUED | OUTPATIENT
Start: 2024-09-24 | End: 2024-09-24 | Stop reason: HOSPADM

## 2024-09-24 RX ORDER — BISACODYL 10 MG
10 SUPPOSITORY, RECTAL RECTAL DAILY PRN
Status: DISCONTINUED | OUTPATIENT
Start: 2024-09-24 | End: 2024-09-26 | Stop reason: HOSPADM

## 2024-09-24 RX ORDER — ONDANSETRON 2 MG/ML
4 INJECTION INTRAMUSCULAR; INTRAVENOUS EVERY 6 HOURS PRN
Status: DISCONTINUED | OUTPATIENT
Start: 2024-09-24 | End: 2024-09-26 | Stop reason: HOSPADM

## 2024-09-24 RX ORDER — DIPHENHYDRAMINE HYDROCHLORIDE 50 MG/ML
12.5 INJECTION INTRAMUSCULAR; INTRAVENOUS EVERY 6 HOURS PRN
Status: ACTIVE | OUTPATIENT
Start: 2024-09-24 | End: 2024-09-25

## 2024-09-24 RX ORDER — ACETAMINOPHEN 500 MG
1000 TABLET ORAL ONCE
Status: COMPLETED | OUTPATIENT
Start: 2024-09-24 | End: 2024-09-24

## 2024-09-24 RX ORDER — LIDOCAINE HYDROCHLORIDE 10 MG/ML
0.5 INJECTION, SOLUTION EPIDURAL; INFILTRATION; INTRACAUDAL; PERINEURAL ONCE AS NEEDED
Status: DISCONTINUED | OUTPATIENT
Start: 2024-09-24 | End: 2024-09-24 | Stop reason: HOSPADM

## 2024-09-24 RX ORDER — SODIUM CHLORIDE, SODIUM LACTATE, POTASSIUM CHLORIDE, CALCIUM CHLORIDE 600; 310; 30; 20 MG/100ML; MG/100ML; MG/100ML; MG/100ML
150 INJECTION, SOLUTION INTRAVENOUS CONTINUOUS
Status: DISCONTINUED | OUTPATIENT
Start: 2024-09-24 | End: 2024-09-24

## 2024-09-24 RX ORDER — SODIUM CHLORIDE 0.9 % (FLUSH) 0.9 %
10 SYRINGE (ML) INJECTION EVERY 12 HOURS SCHEDULED
Status: DISCONTINUED | OUTPATIENT
Start: 2024-09-24 | End: 2024-09-24 | Stop reason: HOSPADM

## 2024-09-24 RX ORDER — HYDROMORPHONE HYDROCHLORIDE 2 MG/ML
0.25 INJECTION, SOLUTION INTRAMUSCULAR; INTRAVENOUS; SUBCUTANEOUS
Status: DISCONTINUED | OUTPATIENT
Start: 2024-09-24 | End: 2024-09-26 | Stop reason: HOSPADM

## 2024-09-24 RX ORDER — METOCLOPRAMIDE HYDROCHLORIDE 5 MG/ML
10 INJECTION INTRAMUSCULAR; INTRAVENOUS
Status: DISCONTINUED | OUTPATIENT
Start: 2024-09-24 | End: 2024-09-24 | Stop reason: HOSPADM

## 2024-09-24 RX ORDER — CARBOPROST TROMETHAMINE 250 UG/ML
250 INJECTION, SOLUTION INTRAMUSCULAR AS NEEDED
Status: DISCONTINUED | OUTPATIENT
Start: 2024-09-24 | End: 2024-09-24 | Stop reason: HOSPADM

## 2024-09-24 RX ORDER — TRANEXAMIC ACID 10 MG/ML
1000 INJECTION, SOLUTION INTRAVENOUS ONCE AS NEEDED
Status: DISCONTINUED | OUTPATIENT
Start: 2024-09-24 | End: 2024-09-24 | Stop reason: HOSPADM

## 2024-09-24 RX ORDER — OXYCODONE HYDROCHLORIDE 5 MG/1
10 TABLET ORAL EVERY 4 HOURS PRN
Status: DISCONTINUED | OUTPATIENT
Start: 2024-09-24 | End: 2024-09-26 | Stop reason: HOSPADM

## 2024-09-24 RX ORDER — MISOPROSTOL 200 UG/1
200 TABLET ORAL
Status: COMPLETED | OUTPATIENT
Start: 2024-09-24 | End: 2024-09-24

## 2024-09-24 RX ORDER — EPHEDRINE SULFATE 50 MG/ML
INJECTION INTRAVENOUS AS NEEDED
Status: DISCONTINUED | OUTPATIENT
Start: 2024-09-24 | End: 2024-09-24 | Stop reason: SURG

## 2024-09-24 RX ORDER — DOCUSATE SODIUM 100 MG/1
100 CAPSULE, LIQUID FILLED ORAL 2 TIMES DAILY
Status: DISCONTINUED | OUTPATIENT
Start: 2024-09-24 | End: 2024-09-26 | Stop reason: HOSPADM

## 2024-09-24 RX ORDER — KETOROLAC TROMETHAMINE 30 MG/ML
30 INJECTION, SOLUTION INTRAMUSCULAR; INTRAVENOUS ONCE
Status: COMPLETED | OUTPATIENT
Start: 2024-09-24 | End: 2024-09-24

## 2024-09-24 RX ORDER — OXYTOCIN 10 [USP'U]/ML
INJECTION, SOLUTION INTRAMUSCULAR; INTRAVENOUS AS NEEDED
Status: DISCONTINUED | OUTPATIENT
Start: 2024-09-24 | End: 2024-09-24 | Stop reason: SURG

## 2024-09-24 RX ORDER — FAMOTIDINE 10 MG/ML
20 INJECTION, SOLUTION INTRAVENOUS EVERY 12 HOURS SCHEDULED
Status: DISCONTINUED | OUTPATIENT
Start: 2024-09-24 | End: 2024-09-24

## 2024-09-24 RX ORDER — CALCIUM CARBONATE 500 MG/1
1 TABLET, CHEWABLE ORAL EVERY 4 HOURS PRN
Status: DISCONTINUED | OUTPATIENT
Start: 2024-09-24 | End: 2024-09-26 | Stop reason: HOSPADM

## 2024-09-24 RX ORDER — FAMOTIDINE 10 MG/ML
20 INJECTION, SOLUTION INTRAVENOUS ONCE AS NEEDED
Status: DISCONTINUED | OUTPATIENT
Start: 2024-09-24 | End: 2024-09-24 | Stop reason: HOSPADM

## 2024-09-24 RX ORDER — MISOPROSTOL 200 UG/1
800 TABLET ORAL AS NEEDED
Status: DISCONTINUED | OUTPATIENT
Start: 2024-09-24 | End: 2024-09-24 | Stop reason: HOSPADM

## 2024-09-24 RX ORDER — ONDANSETRON 2 MG/ML
4 INJECTION INTRAMUSCULAR; INTRAVENOUS EVERY 6 HOURS PRN
Status: DISCONTINUED | OUTPATIENT
Start: 2024-09-24 | End: 2024-09-24 | Stop reason: HOSPADM

## 2024-09-24 RX ORDER — CITRIC ACID/SODIUM CITRATE 334-500MG
30 SOLUTION, ORAL ORAL ONCE
Status: DISCONTINUED | OUTPATIENT
Start: 2024-09-24 | End: 2024-09-24 | Stop reason: HOSPADM

## 2024-09-24 RX ORDER — MORPHINE SULFATE 0.5 MG/ML
INJECTION, SOLUTION EPIDURAL; INTRATHECAL; INTRAVENOUS
Status: COMPLETED | OUTPATIENT
Start: 2024-09-24 | End: 2024-09-24

## 2024-09-24 RX ADMIN — MORPHINE SULFATE 0.1 MG: 0.5 INJECTION, SOLUTION EPIDURAL; INTRATHECAL; INTRAVENOUS at 07:23

## 2024-09-24 RX ADMIN — OXYTOCIN 40 UNITS: 10 INJECTION, SOLUTION INTRAMUSCULAR; INTRAVENOUS at 07:49

## 2024-09-24 RX ADMIN — FAMOTIDINE 20 MG: 10 INJECTION INTRAVENOUS at 10:00

## 2024-09-24 RX ADMIN — Medication 100 MCG: at 07:25

## 2024-09-24 RX ADMIN — Medication 100 MCG: at 07:34

## 2024-09-24 RX ADMIN — Medication 100 MCG: at 07:29

## 2024-09-24 RX ADMIN — SODIUM CHLORIDE, POTASSIUM CHLORIDE, SODIUM LACTATE AND CALCIUM CHLORIDE: 600; 310; 30; 20 INJECTION, SOLUTION INTRAVENOUS at 07:49

## 2024-09-24 RX ADMIN — DEXMEDETOMIDINE HYDROCHLORIDE 7 MCG: 100 INJECTION, SOLUTION, CONCENTRATE INTRAVENOUS at 07:23

## 2024-09-24 RX ADMIN — MISOPROSTOL 200 MCG: 200 TABLET ORAL at 11:41

## 2024-09-24 RX ADMIN — ONDANSETRON HYDROCHLORIDE 8 MG: 2 SOLUTION INTRAMUSCULAR; INTRAVENOUS at 07:22

## 2024-09-24 RX ADMIN — SODIUM CHLORIDE, POTASSIUM CHLORIDE, SODIUM LACTATE AND CALCIUM CHLORIDE 150 ML/HR: 600; 310; 30; 20 INJECTION, SOLUTION INTRAVENOUS at 10:15

## 2024-09-24 RX ADMIN — FAMOTIDINE 20 MG: 10 INJECTION INTRAVENOUS at 07:27

## 2024-09-24 RX ADMIN — BUPIVACAINE HYDROCHLORIDE 1.4 ML: 7.5 INJECTION, SOLUTION EPIDURAL; RETROBULBAR at 07:23

## 2024-09-24 RX ADMIN — ACETAMINOPHEN 1000 MG: 500 TABLET ORAL at 19:29

## 2024-09-24 RX ADMIN — KETOROLAC TROMETHAMINE 15 MG: 15 INJECTION, SOLUTION INTRAMUSCULAR; INTRAVENOUS at 21:51

## 2024-09-24 RX ADMIN — EPHEDRINE SULFATE 10 MG: 50 INJECTION INTRAVENOUS at 07:31

## 2024-09-24 RX ADMIN — MISOPROSTOL 200 MCG: 200 TABLET ORAL at 15:58

## 2024-09-24 RX ADMIN — EPHEDRINE SULFATE 40 MG: 50 INJECTION INTRAVENOUS at 07:31

## 2024-09-24 RX ADMIN — CALCIUM CARBONATE 1 TABLET: 500 TABLET, CHEWABLE ORAL at 19:29

## 2024-09-24 RX ADMIN — SODIUM CHLORIDE, POTASSIUM CHLORIDE, SODIUM LACTATE AND CALCIUM CHLORIDE: 600; 310; 30; 20 INJECTION, SOLUTION INTRAVENOUS at 07:13

## 2024-09-24 RX ADMIN — DIPHENHYDRAMINE HYDROCHLORIDE 25 MG: 25 CAPSULE ORAL at 16:00

## 2024-09-24 RX ADMIN — SODIUM CHLORIDE 2 G: 9 INJECTION, SOLUTION INTRAVENOUS at 07:07

## 2024-09-24 RX ADMIN — KETOROLAC TROMETHAMINE 15 MG: 15 INJECTION, SOLUTION INTRAMUSCULAR; INTRAVENOUS at 15:08

## 2024-09-24 RX ADMIN — ACETAMINOPHEN 1000 MG: 500 TABLET ORAL at 07:08

## 2024-09-24 RX ADMIN — SODIUM CHLORIDE, POTASSIUM CHLORIDE, SODIUM LACTATE AND CALCIUM CHLORIDE 1000 ML: 600; 310; 30; 20 INJECTION, SOLUTION INTRAVENOUS at 06:23

## 2024-09-24 RX ADMIN — KETOROLAC TROMETHAMINE 30 MG: 30 INJECTION, SOLUTION INTRAMUSCULAR; INTRAVENOUS at 09:11

## 2024-09-24 RX ADMIN — DOCUSATE SODIUM 100 MG: 100 CAPSULE, LIQUID FILLED ORAL at 21:51

## 2024-09-24 RX ADMIN — ACETAMINOPHEN 1000 MG: 500 TABLET ORAL at 13:14

## 2024-09-24 RX ADMIN — Medication 125 ML/HR: at 09:11

## 2024-09-24 NOTE — L&D DELIVERY NOTE
"[facility]   Delivery Procedure Report    Patient Name:  Genesis Carver  YOB: 2000  MRN: [id number]  Date of Procedure:  2024     Pre-Operative Diagnosis:   24 y.o.  at 39w0d    Previous  delivery affecting pregnancy    Short interval between pregnancies affecting pregnancy, antepartum    Unwanted fertility    Post Operative Diagnosis:  24 y.o.  at 39w0d     delivery delivered    Previous  delivery affecting pregnancy    Short interval between pregnancies affecting pregnancy, antepartum    Unwanted fertility    Status post bilateral salpingectomy    Procedure(s):   SECTION REPEAT WITH TUBAL    Surgeon: Surgeon(s):  Indio Soliz MD    Assistant:    Jennifer Padilla MD    Anesthesia:   Spinal    Estimated Blood Loss: 800 mL    Antibiotics:   Kefzol    Specimens:          Placenta - discarded and Bilateral fallopian tubes    Findings:  Delivery Date:                       2024   Delivery Time:                       7:47 AM     Infant:                                    female  infant                                     3140 g (6 lb 14.8 oz)   APGAR:                                8  @ 1 minute / 9  @ 5 minutes  Cord: 3 vessels present.   Nuchal Cord:  no     Placenta Delivered:  Manual removal  at   2024  7:49 AM     Cord Blood Obtained:     Cord Gases Obtained:      Cord Gas Results: Venous:  No results found for: \"PHCVEN\", \"BECVEN\"    Arterial:  No results found for: \"PHCART\", \"BECART\"       Complications: None    Description of Procedure:  After reviewing the informed consent, the patient expressed her understanding and desire to proceed.  She was taken to the operating room with an IV in place and running.  She was placed on the operating table in the sitting position.  The patient was given a spinal anesthetic.  She was placed in the dorsal supine position with leftward tilt.  A sterile Plummer catheter was " inserted into the patient's bladder.  The patient was then prepped and draped in the usual sterile fashion. After a surgical timeout was performed, and the patient's anesthesia was found to be adequate I made a Pfannenstiel skin incision with the scalpel.  The incision was carried down to the underlying fascia with the cautery. The fascia was nicked in the midline. The fascia was extended laterally, in a curvilinear fashion with Giang scissors.  I used 2 kochers to grasp the superior portion of the fascia, and this was taken off the rectus muscle sharply.  The kochers were removed and replaced on the posterior portion of the fascia. The fascia was taken off the rectus muscle sharply.  The midline was identified, tented up with 2 hemostats, and entered sharply.  The peritoneum was extended in a sharp fashion with good visualization of the bladder.  The bladder blade was placed.  The bladder flap was developed sharply.  I made a low transverse uterine incision with the scalpel.  The uterine cavity was entered bluntly, and membranes were artificially ruptured.  The fluid color was clear.  The fetal head was gently lifted to the hysterotomy, and the baby was delivered with gentle fundal pressure at 7:47 AM.  After complete delivery of the infant, the mouth and nose were bulb suctioned, the cord was doubly clamped and cut, and the baby was passed off to the awaiting nurses. Apgars were 8 and 9 at 1 and 5 minutes respectively. The baby's birth weight was 6 pounds 15 ounces. Cord blood and gases were collected, and the placenta was delivered manually and intact at 7:49 AM.  The uterus was then exteriorized, and the endometrium was curetted with a dry lap.  The hysterotomy was then closed in 2 layers.  The first layer was closed with a running 0 Monocryl suture.  The second layer was closed with a horizontal imbricating 0 Monocryl suture.  There was excellent hemostasis after closure of both layers.  I then proceeded on with  the sterilization.  The patient's right fallopian tube was tented up.  I sealed and divided the tissue between the tube and ovary distally.  I carried this dissection proximally to the junction of the fallopian tube and uterine cornua.  This amputated the fallopian tube completely.  There was excellent hemostasis on the surgical bed.  I carried out the same procedure on the left side, completing the salpingectomy.  Again there was excellent hemostasis.  I irrigated posterior to the uterus.  The uterus was reinserted into the peritoneal cavity.  The paracolic gutters were copiously irrigated.  The hysterotomy was reinspected, and noted to be hemostatic.  3 Daina clamps were used to grasp the peritoneum, and the peritoneum was closed with a 3-0 Monocryl suture in a running fashion.  The rectus muscle was reapproximated with 3 interrupted horizontal mattress sutures using a #1 chromic suture.  The fascia was then closed in a running fashion using a 0 Vicryl suture.  The subcutaneous spaces were copiously irrigated, and hemostasis was achieved with the cautery.  The subcutaneous space was then reapproximated with a 3-0 Monocryl suture in a running fashion.  The skin was closed with a 4-0 Monocryl suture in a subcuticular fashion.  A sterile towels applied over the incision.  The patient was placed in a frog-leg position, and the drapes were removed.  I expressed any remaining blood and clot from the uterus.  A sterile bandage was applied to the incision, and the patient was transferred to the recovery room in satisfactory condition.  The patient received Kefzol as her preoperative antibiotics.  All counts were correct x2 at the end of the procedure.  Both mother and  are recovering in excellent condition in the recovery area.       Jennifer Padilla MD was responsible for performing the following activities: Retraction, Suction, Irrigation, and Delivery of Fetus and their skilled assistance was necessary for  the success of this case.    Indio Soliz MD     Date: 9/24/2024  Time: 09:54 EDT

## 2024-09-24 NOTE — PROGRESS NOTES
Assisted Dr. Soliz on patient's repeat  section and bilateral salpingectomy. Please see op note.    Jennifer Padilla MD  2024  08:12 EDT

## 2024-09-24 NOTE — NURSING NOTE
Pt moved to rm 430 via bed. Bs report to Kailey MIKE FF 2 below med amt of lochia with sm clot pads changed.

## 2024-09-24 NOTE — NURSING NOTE
39 weeks presents for scheduled RC/S with BTL, pt. Denies ctx, leakage of fluid, or vaginal bleeding, states +fm noted, pt. Prep for surgery per MD orders, s/o at bedside supportive.

## 2024-09-24 NOTE — H&P
[facility]  Obstetric History and Physical    Patient Name: Genesis Carver  : 2000  MRN: [id number]  Primary Care Physician:  Brooke Merrill APRN  Date of admission: 2024    Chief Complaint:  Scheduled CS    Subjective     Subjective:  The patient is a 24 y.o.  currently at 39w0d, who presents for a scheduled repeat  delivery with bilateral salpingectomy.  The patient has no complaints today.  She reports occasional contractions.  She denies any vaginal bleeding, loss of fluid, or decreased fetal movement.  The patient has expressed desire for permanent sterilization throughout the pregnancy.  She states she has no desire for any future childbearing.  She declines alternative forms of birth control including all reversible contraception.  She wishes to have complete bilateral salpingectomy as her sterilization method, and she understands this is completely irreversible.    Her prenatal care is complicated by: Prior , short interval between pregnancies      Personal History     Prenatal Information:  Prenatal Results       Initial Prenatal Labs       Test Value Reference Range Date Time    Hemoglobin  12.7 g/dL 12.0 - 15.9 24 1045    Hematocrit  38.3 % 34.0 - 46.6 24 1045    Platelets  255 10*3/mm3 140 - 450 24 1045    Rubella IgG  <0.90 index Immune >0.99 24 1045    Hepatitis B SAg  Non-Reactive  Non-Reactive 24 1045    Hepatitis C Ab  Non-Reactive  Non-Reactive 24 1045    RPR        T. Pallidum Ab   Non-Reactive  Non-Reactive 24 1045    ABO  B   24 1045    Rh  Positive   24 1045    Antibody Screen  Negative   24 1045    HIV  Non-Reactive  Non-Reactive 24 1045    Urine Culture  50,000 CFU/mL Normal Urogenital Priscilla   24 1358       >100,000 CFU/mL Normal Urogenital Priscilla   24 1431       25,000 CFU/mL Normal Urogenital Priscilla   24 1036    Gonorrhea  Negative  Negative 24 1036     Chlamydia  Negative  Negative 03/19/24 1036    TSH        HgB A1c         Varicella IgG        Hemoglobinopathy Fractionation  Comment   09/09/22 1414    Hemoglobinopathy (genetic testing)        Cystic fibrosis                   Fetal testing        Test Value Reference Range Date Time    NIPT        MSAFP        AFP-4                  2nd and 3rd Trimester       Test Value Reference Range Date Time    Hemoglobin (repeated)  11.5 g/dL 12.0 - 15.9 09/05/24 1440       11.9 g/dL 12.0 - 15.9 06/12/24 1501    Hematocrit (repeated)  34.9 % 34.0 - 46.6 09/05/24 1440       36.5 % 34.0 - 46.6 06/12/24 1501    Platelets   202 10*3/mm3 140 - 450 09/05/24 1440       254 10*3/mm3 140 - 450 06/12/24 1501       255 10*3/mm3 140 - 450 03/19/24 1045    1 hour GTT   110 mg/dL 65 - 139 06/12/24 1501    Antibody Screen (repeated)        3rd TM syphilis scrn (repeated)  RPR         3rd TM syphilis scrn (repeated) TP-Ab        3rd TM syphilis screen TB-Ab (FTA)        Syphilis cascade test TP-Ab (EIA)        Syphilis cascade TPPA        GTT Fasting        GTT 1 Hr        GTT 2 Hr        GTT 3 Hr        Group B Strep  Positive  Negative 09/05/24 1358              Other testing        Test Value Reference Range Date Time    Parvo IgG         CMV IgG                   Drug Screening       Test Value Reference Range Date Time    Amphetamine Screen        Barbiturate Screen  Negative  Negative 03/19/24 1036    Benzodiazepine Screen  Negative  Negative 03/19/24 1036    Methadone Screen  Negative  Negative 03/19/24 1036    Phencyclidine Screen        Opiates Screen  Negative  Negative 03/19/24 1036    THC Screen  Negative  Negative 03/19/24 1036    Cocaine Screen  Negative  Negative 03/19/24 1036    Propoxyphene Screen        Buprenorphine Screen        Methamphetamine Screen        Oxycodone Screen  Negative  Negative 03/19/24 1036    Tricyclic Antidepressants Screen                  Legend    ^: Historical                          External  Prenatal Results       Pregnancy Outside Results - Transcribed From Office Records - See Scanned Records For Details       Test Value Date Time    ABO  B  24 1045    Rh  Positive  24 1045    Antibody Screen  Negative  24 1045    Varicella IgG       Rubella  <0.90 index 24 1045    Hgb  11.5 g/dL 24 1440       11.9 g/dL 24 1501       12.7 g/dL 24 1045    Hct  34.9 % 24 1440       36.5 % 24 1501       38.3 % 24 1045    HgB A1c        1h GTT  110 mg/dL 24 1501    3h GTT Fasting       3h GTT 1 hour       3h GTT 2 hour       3h GTT 3 hour        Gonorrhea (discrete)  Negative  24 1036    Chlamydia (discrete)  Negative  24 1036    RPR       Syphils cascade: TP-Ab (FTA)  Non-Reactive  24 1045    TP-Ab  Non-Reactive  24 1045    TP-Ab (EIA)       TPPA       HBsAg  Non-Reactive  24 1045    Herpes Simplex Virus PCR       Herpes Simplex VIrus Culture       HIV  Non-Reactive  24 1045    Hep C RNA Quant PCR       Hep C Antibody  Non-Reactive  24 1045    AFP       NIPT       Cystic Fibroisis        Group B Strep  Positive  24 1358    GBS Susceptibility to Clindamycin       GBS Susceptibility to Erythromycin       Fetal Fibronectin       Genetic Testing, Maternal Blood                 Drug Screening       Test Value Date Time    Urine Drug Screen       Amphetamine Screen       Barbiturate Screen  Negative  24 1036    Benzodiazepine Screen  Negative  24 1036    Methadone Screen  Negative  24 1036    Phencyclidine Screen       Opiates Screen  Negative  24 1036    THC Screen  Negative  24 1036    Cocaine Screen       Propoxyphene Screen       Buprenorphine Screen       Methamphetamine Screen       Oxycodone Screen  Negative  24 1036    Tricyclic Antidepressants Screen                 Legend    ^: Historical                          OB History    Para Term  AB Living   3 1 1  0 1 1   SAB IAB Ectopic Molar Multiple Live Births   1 0 0 0 0 1      # Outcome Date GA Lbr Govind/2nd Weight Sex Type Anes PTL Lv   3 Current            2 Term 23 37w1d 23:20 / 03:01 2760 g (6 lb 1.4 oz) M CS-LTranv EPI N CONSTANCE      Complications: Intraamniotic Infection, Failure to Progress in Second Stage      Name: ODILIA BEJARANO      Apgar1: 8  Apgar5: 9   1 SAB              Past Medical History:   Diagnosis Date    Complete miscarriage 2022    Palpitations 2021    Reflex tachycardia     Single delivery by  2023     Past Surgical History:   Procedure Laterality Date     SECTION N/A 2023    Procedure:  SECTION PRIMARY;  Surgeon: Taryn Beckett DO;  Location: [facility] LABOR DELIVERY;  Service: Gynecology;  Laterality: N/A;     Family History   Problem Relation Age of Onset    No Known Problems Father     Breast cancer Neg Hx     Ovarian cancer Neg Hx     Uterine cancer Neg Hx     Colon cancer Neg Hx     Melanoma Neg Hx     Prostate cancer Neg Hx      Social History:   reports that she has never smoked. She has never been exposed to tobacco smoke. She has never used smokeless tobacco. She reports that she does not drink alcohol and does not use drugs.    Medications:  Prenatal MV-Min-Fe Fum-FA-DHA    Allergies:  No Known Allergies    Review of Systems:  No leaking fluid, No vaginal bleeding, Is feeling adequate FM, No HA, No scotomata or vision changes, No RUQ/epigastric pain, No fever/chills, No nausea, No vomiting, No diarrhea, No constipation, No abdominal pain, Contractions, Swelling, and Back pain    Objective     Vital Signs:  Temp:  [98.3 °F (36.8 °C)] 98.3 °F (36.8 °C)  Heart Rate:  [117] 117  Resp:  [20] 20  BP: (99)/(73) 99/73    Physical Exam:  General:  alert, well appearing, in no apparent distress  HEENT: PERRLA, extra ocular movement intact, sclera clear, anicteric, and neck supple with midline trachea  Cardiovascular: normal rate, regular rhythm,  no  murmurs, rubs, or gallops  Lungs: clear to auscultation, no wheezes, rales or rhonchi, symmetric air entry  Abdomen: soft, nontender, nondistended, no abnormal masses, no epigastric pain, fundus soft, nontender 39 weeks size, and estimated fetal weight: 7-7.5 lbs  Extremities: 1+ edema, no redness or tenderness in the calves or thighs 2+ bilaterally    Fetal Assessment:  Baseline: 155  Variability: Moderate  Accelerations: Present (32 weeks+) 15 x 15 bpm  Decelerations: Absent   Category: Category 1    Contractions: Irregular    Fetal Presentation: cephalic    Labs:   Lab Results (last 24 hours)       Procedure Component Value Units Date/Time    Treponema pallidum AB w/Reflex RPR [[id number]] Collected: 09/24/24 0631    Specimen: Blood Updated: 09/24/24 0639    CBC & Differential [[id number]]  (Abnormal) Collected: 09/24/24 0631    Specimen: Blood Updated: 09/24/24 0653    Narrative:      The following orders were created for panel order CBC & Differential.  Procedure                               Abnormality         Status                     ---------                               -----------         ------                     CBC Auto Differential[[id number]]        Abnormal            Final result                 Please view results for these tests on the individual orders.    Urine Drug Screen - Urine, Clean Catch [[id number]] Collected: 09/24/24 0631    Specimen: Urine, Clean Catch Updated: 09/24/24 0638    CBC Auto Differential [[id number]]  (Abnormal) Collected: 09/24/24 0631    Specimen: Blood Updated: 09/24/24 0653     WBC 10.40 10*3/mm3      RBC 4.39 10*6/mm3      Hemoglobin 11.5 g/dL      Hematocrit 35.5 %      MCV 80.9 fL      MCH 26.2 pg      MCHC 32.4 g/dL      RDW 13.1 %      RDW-SD 37.9 fl      MPV 10.8 fL      Platelets 227 10*3/mm3      Neutrophil % 61.4 %      Lymphocyte % 28.4 %      Monocyte % 8.2 %      Eosinophil % 0.5 %      Basophil % 0.2 %      Immature Grans % 1.3 %      Neutrophils, Absolute  6.40 10*3/mm3      Lymphocytes, Absolute 2.95 10*3/mm3      Monocytes, Absolute 0.85 10*3/mm3      Eosinophils, Absolute 0.05 10*3/mm3      Basophils, Absolute 0.02 10*3/mm3      Immature Grans, Absolute 0.13 10*3/mm3      nRBC 0.0 /100 WBC              Assessment / Plan     Assessment:  24 y.o.  currently at 39w0d    Previous  delivery affecting pregnancy    Short interval between pregnancies affecting pregnancy, antepartum    Unwanted fertility    GBS: Positive    Plan:  The patient desires to proceed with repeat  delivery and complete bilateral salpingectomy  We have reviewed the risks, benefits, and alternatives of the procedure including the risk of: bleeding, infection, hemorrhage, blood transfusion, risk of injury to nearby structures including: bowl, bladder, pelvic blood vessels and nerves, risk of injury to the baby, risk of anesthesia, venous thromboembolism, myocardial infarction, stroke, and death. We have also discussed the risks of repetitive  deliveries including the risk of abnormal placentation such as placenta accreta. The patient expresses her understanding of these risks and wishes to proceed.  We have discussed the risks, benefits, and alternatives to proceeding with bilateral salpingectomy at the time of  delivery.  We discussed the risks including the risk of infection, bleeding and hemorrhage, injury to nearby structure including, bowel, bladder, pelvic vasculature, pelvic nerves, ovaries, and the uterus, and other nearby structures.  We have discussed the risks of regret, risk of failure, and if failure occurred and increased risk for ectopic pregnancy.  We discussed the risk of menstrual changes, hormonal changes, and risk of pelvic pain post sterilization.  The patient has been offered alternative forms of birth control including: Oral contraceptives, NuvaRing, birth control patches, progesterone only birth control pills, Depo-Provera, all  intrauterine contraceptives, partner vasectomy.  The patient expresses her understanding and wished to proceed with female permanent sterilization.  We have discussed the different methods of female sterilization including hysteroscopic and laparoscopic techniques.  With the laparoscopic techniques we have discussed occlusion of the tube with Filshie clips, Hulka clips, and Falope-Rings.  We have discussed cauterization of the fallopian tube, partial salpingectomy, and complete salpingectomy.  The patient desires complete salpingectomy.  She understands that this is irreversible and permanent.  Her only option for future childbearing would be in vitro fertilization.  The patient wishes to proceed  Plan of care has been reviewed with patient  Risks, benefits of treatment plan have been discussed.  All questions have been answered.    Electronically signed by Indio Soliz MD, 09/24/24, 6:52 AM EDT.

## 2024-09-25 LAB
BASOPHILS # BLD AUTO: 0.02 10*3/MM3 (ref 0–0.2)
BASOPHILS NFR BLD AUTO: 0.2 % (ref 0–1.5)
CYTO UR: NORMAL
DEPRECATED RDW RBC AUTO: 40.3 FL (ref 37–54)
EOSINOPHIL # BLD AUTO: 0.06 10*3/MM3 (ref 0–0.4)
EOSINOPHIL NFR BLD AUTO: 0.6 % (ref 0.3–6.2)
ERYTHROCYTE [DISTWIDTH] IN BLOOD BY AUTOMATED COUNT: 13.3 % (ref 12.3–15.4)
HCT VFR BLD AUTO: 29.2 % (ref 34–46.6)
HGB BLD-MCNC: 9.5 G/DL (ref 12–15.9)
IMM GRANULOCYTES # BLD AUTO: 0.08 10*3/MM3 (ref 0–0.05)
IMM GRANULOCYTES NFR BLD AUTO: 0.7 % (ref 0–0.5)
LAB AP CASE REPORT: NORMAL
LAB AP CLINICAL INFORMATION: NORMAL
LYMPHOCYTES # BLD AUTO: 2.33 10*3/MM3 (ref 0.7–3.1)
LYMPHOCYTES NFR BLD AUTO: 21.5 % (ref 19.6–45.3)
MCH RBC QN AUTO: 27.1 PG (ref 26.6–33)
MCHC RBC AUTO-ENTMCNC: 32.5 G/DL (ref 31.5–35.7)
MCV RBC AUTO: 83.4 FL (ref 79–97)
MONOCYTES # BLD AUTO: 0.77 10*3/MM3 (ref 0.1–0.9)
MONOCYTES NFR BLD AUTO: 7.1 % (ref 5–12)
NEUTROPHILS NFR BLD AUTO: 69.9 % (ref 42.7–76)
NEUTROPHILS NFR BLD AUTO: 7.58 10*3/MM3 (ref 1.7–7)
NRBC BLD AUTO-RTO: 0 /100 WBC (ref 0–0.2)
PATH REPORT.FINAL DX SPEC: NORMAL
PATH REPORT.GROSS SPEC: NORMAL
PLATELET # BLD AUTO: 233 10*3/MM3 (ref 140–450)
PMV BLD AUTO: 10.9 FL (ref 6–12)
RBC # BLD AUTO: 3.5 10*6/MM3 (ref 3.77–5.28)
WBC NRBC COR # BLD AUTO: 10.84 10*3/MM3 (ref 3.4–10.8)

## 2024-09-25 PROCEDURE — 85025 COMPLETE CBC W/AUTO DIFF WBC: CPT | Performed by: OBSTETRICS & GYNECOLOGY

## 2024-09-25 PROCEDURE — 25010000002 KETOROLAC TROMETHAMINE PER 15 MG: Performed by: OBSTETRICS & GYNECOLOGY

## 2024-09-25 RX ADMIN — DOCUSATE SODIUM 100 MG: 100 CAPSULE, LIQUID FILLED ORAL at 08:45

## 2024-09-25 RX ADMIN — ACETAMINOPHEN 1000 MG: 500 TABLET ORAL at 06:32

## 2024-09-25 RX ADMIN — IBUPROFEN 600 MG: 600 TABLET, FILM COATED ORAL at 14:02

## 2024-09-25 RX ADMIN — ACETAMINOPHEN 650 MG: 325 TABLET ORAL at 14:03

## 2024-09-25 RX ADMIN — ACETAMINOPHEN 1000 MG: 500 TABLET ORAL at 00:12

## 2024-09-25 RX ADMIN — KETOROLAC TROMETHAMINE 15 MG: 15 INJECTION, SOLUTION INTRAMUSCULAR; INTRAVENOUS at 08:45

## 2024-09-25 RX ADMIN — KETOROLAC TROMETHAMINE 15 MG: 15 INJECTION, SOLUTION INTRAMUSCULAR; INTRAVENOUS at 03:42

## 2024-09-25 RX ADMIN — IBUPROFEN 600 MG: 600 TABLET, FILM COATED ORAL at 21:05

## 2024-09-25 RX ADMIN — ACETAMINOPHEN 650 MG: 325 TABLET ORAL at 18:04

## 2024-09-25 RX ADMIN — DOCUSATE SODIUM 100 MG: 100 CAPSULE, LIQUID FILLED ORAL at 21:05

## 2024-09-25 RX ADMIN — CALCIUM CARBONATE 1 TABLET: 500 TABLET, CHEWABLE ORAL at 00:12

## 2024-09-25 NOTE — PLAN OF CARE
Problem: Adult Inpatient Plan of Care  Goal: Plan of Care Review  Outcome: Progressing  Goal: Patient-Specific Goal (Individualized)  Outcome: Progressing  Goal: Absence of Hospital-Acquired Illness or Injury  Outcome: Progressing  Intervention: Identify and Manage Fall Risk  Recent Flowsheet Documentation  Taken 9/25/2024 0500 by Georgia Son RN  Safety Promotion/Fall Prevention: safety round/check completed  Taken 9/25/2024 0342 by Georgia Son RN  Safety Promotion/Fall Prevention: safety round/check completed  Taken 9/25/2024 0200 by Georgia Son RN  Safety Promotion/Fall Prevention: safety round/check completed  Goal: Optimal Comfort and Wellbeing  Outcome: Progressing  Goal: Readiness for Transition of Care  Outcome: Progressing     Problem: Device-Related Complication Risk (Anesthesia/Analgesia, Neuraxial)  Goal: Safe Infusion Delivery Completion  Outcome: Progressing     Problem: Infection (Anesthesia/Analgesia, Neuraxial)  Goal: Absence of Infection Signs and Symptoms  Outcome: Progressing     Problem: Nausea and Vomiting (Anesthesia/Analgesia, Neuraxial)  Goal: Nausea and Vomiting Relief  Outcome: Progressing     Problem: Pain (Anesthesia/Analgesia, Neuraxial)  Goal: Effective Pain Control  Outcome: Progressing     Problem: Respiratory Compromise (Anesthesia/Analgesia, Neuraxial)  Goal: Effective Oxygenation and Ventilation  Outcome: Progressing     Problem: Sensorimotor Impairment (Anesthesia/Analgesia, Neuraxial)  Goal: Baseline Motor Function  Outcome: Progressing  Intervention: Optimize Sensorimotor Function  Recent Flowsheet Documentation  Taken 9/25/2024 0500 by Georgia Son RN  Safety Promotion/Fall Prevention: safety round/check completed  Taken 9/25/2024 0342 by Georgia Son RN  Safety Promotion/Fall Prevention: safety round/check completed  Taken 9/25/2024 0200 by Georgia Son RN  Safety Promotion/Fall Prevention: safety round/check completed     Problem: Urinary Retention  (Anesthesia/Analgesia, Neuraxial)  Goal: Effective Urinary Elimination  Outcome: Progressing     Problem: Adjustment to Role Transition (Postpartum  Delivery)  Goal: Successful Maternal Role Transition  Outcome: Progressing  Goal: Successful Maternal Role Transition  Outcome: Progressing     Problem: Bleeding (Postpartum  Delivery)  Goal: Hemostasis  Outcome: Progressing  Goal: Hemostasis  Outcome: Progressing     Problem: Infection (Postpartum  Delivery)  Goal: Absence of Infection Signs and Symptoms  Outcome: Progressing  Goal: Absence of Infection Signs and Symptoms  Outcome: Progressing     Problem: Pain (Postpartum  Delivery)  Goal: Acceptable Pain Control  Outcome: Progressing  Goal: Acceptable Pain Control  Outcome: Progressing     Problem: Postoperative Nausea and Vomiting (Postpartum  Delivery)  Goal: Nausea and Vomiting Relief  Outcome: Progressing  Goal: Nausea and Vomiting Relief  Outcome: Progressing     Problem: Postoperative Urinary Retention (Postpartum  Delivery)  Goal: Effective Urinary Elimination  Outcome: Progressing  Goal: Effective Urinary Elimination  Outcome: Progressing     Problem: Breastfeeding  Goal: Effective Breastfeeding  Outcome: Progressing   Goal Outcome Evaluation:      Ambulating and urinating well, continuing to monitor pain level and bleeding; VS, fundus, and bandage WNL; progressing towards care plan goals. FO RN

## 2024-09-25 NOTE — PLAN OF CARE
Problem: Adult Inpatient Plan of Care  Goal: Plan of Care Review  Outcome: Progressing  Goal: Patient-Specific Goal (Individualized)  Outcome: Progressing  Goal: Absence of Hospital-Acquired Illness or Injury  Outcome: Progressing  Intervention: Identify and Manage Fall Risk  Recent Flowsheet Documentation  Taken 9/25/2024 1403 by Dalila Zayas RN  Safety Promotion/Fall Prevention: safety round/check completed  Taken 9/25/2024 0845 by Dalila Zayas RN  Safety Promotion/Fall Prevention: safety round/check completed  Intervention: Prevent and Manage VTE (Venous Thromboembolism) Risk  Recent Flowsheet Documentation  Taken 9/25/2024 0845 by Dalila Zayas RN  Activity Management: up ad albina  Goal: Optimal Comfort and Wellbeing  Outcome: Progressing  Intervention: Monitor Pain and Promote Comfort  Recent Flowsheet Documentation  Taken 9/25/2024 0845 by Dalila Zayas RN  Pain Management Interventions:   care clustered   pillow support provided   position adjusted   quiet environment facilitated   see MAR   relaxation techniques promoted  Goal: Readiness for Transition of Care  Outcome: Progressing     Problem: Device-Related Complication Risk (Anesthesia/Analgesia, Neuraxial)  Goal: Safe Infusion Delivery Completion  Outcome: Progressing     Problem: Infection (Anesthesia/Analgesia, Neuraxial)  Goal: Absence of Infection Signs and Symptoms  Outcome: Progressing     Problem: Nausea and Vomiting (Anesthesia/Analgesia, Neuraxial)  Goal: Nausea and Vomiting Relief  Outcome: Progressing     Problem: Pain (Anesthesia/Analgesia, Neuraxial)  Goal: Effective Pain Control  Outcome: Progressing  Intervention: Prevent or Manage Pain  Recent Flowsheet Documentation  Taken 9/25/2024 0845 by Dalila Zayas RN  Pain Management Interventions:   care clustered   pillow support provided   position adjusted   quiet environment facilitated   see MAR   relaxation techniques promoted     Problem: Respiratory Compromise  (Anesthesia/Analgesia, Neuraxial)  Goal: Effective Oxygenation and Ventilation  Outcome: Progressing     Problem: Sensorimotor Impairment (Anesthesia/Analgesia, Neuraxial)  Goal: Baseline Motor Function  Outcome: Progressing  Intervention: Optimize Sensorimotor Function  Recent Flowsheet Documentation  Taken 2024 1403 by Dalila Zayas RN  Safety Promotion/Fall Prevention: safety round/check completed  Taken 2024 0845 by Dalila Zayas RN  Safety Promotion/Fall Prevention: safety round/check completed     Problem: Urinary Retention (Anesthesia/Analgesia, Neuraxial)  Goal: Effective Urinary Elimination  Outcome: Progressing     Problem: Adjustment to Role Transition (Postpartum  Delivery)  Goal: Successful Maternal Role Transition  Outcome: Progressing  Goal: Successful Maternal Role Transition  Outcome: Progressing     Problem: Bleeding (Postpartum  Delivery)  Goal: Hemostasis  Outcome: Progressing  Goal: Hemostasis  Outcome: Progressing     Problem: Infection (Postpartum  Delivery)  Goal: Absence of Infection Signs and Symptoms  Outcome: Progressing  Goal: Absence of Infection Signs and Symptoms  Outcome: Progressing     Problem: Pain (Postpartum  Delivery)  Goal: Acceptable Pain Control  Outcome: Progressing  Intervention: Prevent or Manage Pain  Recent Flowsheet Documentation  Taken 2024 0845 by Dalila Zayas RN  Pain Management Interventions:   care clustered   pillow support provided   position adjusted   quiet environment facilitated   see MAR   relaxation techniques promoted  Goal: Acceptable Pain Control  Outcome: Progressing  Intervention: Prevent or Manage Pain  Recent Flowsheet Documentation  Taken 2024 0845 by Dalila Zayas RN  Pain Management Interventions:   care clustered   pillow support provided   position adjusted   quiet environment facilitated   see MAR   relaxation techniques promoted     Problem: Postoperative Nausea and Vomiting  (Postpartum  Delivery)  Goal: Nausea and Vomiting Relief  Outcome: Progressing  Goal: Nausea and Vomiting Relief  Outcome: Progressing     Problem: Postoperative Urinary Retention (Postpartum  Delivery)  Goal: Effective Urinary Elimination  Outcome: Progressing  Goal: Effective Urinary Elimination  Outcome: Progressing     Problem: Breastfeeding  Goal: Effective Breastfeeding  Outcome: Progressing   Goal Outcome Evaluation:

## 2024-09-25 NOTE — LACTATION NOTE
LC in to follow up with breastfeeding progress. Patient has been exclusively breastfeeding/EBM feeding baby. She has a large amount of frozen colostrum stored in the hospital breastmilk freezer. She had been pumping some prenatally. She has a hospital breast pump at her bedside and has started pumping some since infant's birth. Her breasts are slightly ledezma today. LC encouraged her to allow LC to assist with a breastfeeding session today.

## 2024-09-26 VITALS
DIASTOLIC BLOOD PRESSURE: 53 MMHG | HEART RATE: 90 BPM | TEMPERATURE: 98.2 F | HEIGHT: 64 IN | RESPIRATION RATE: 18 BRPM | OXYGEN SATURATION: 100 % | BODY MASS INDEX: 26.63 KG/M2 | WEIGHT: 156 LBS | SYSTOLIC BLOOD PRESSURE: 104 MMHG

## 2024-09-26 PROCEDURE — 90707 MMR VACCINE SC: CPT | Performed by: OBSTETRICS & GYNECOLOGY

## 2024-09-26 PROCEDURE — 90471 IMMUNIZATION ADMIN: CPT | Performed by: OBSTETRICS & GYNECOLOGY

## 2024-09-26 PROCEDURE — 90715 TDAP VACCINE 7 YRS/> IM: CPT | Performed by: OBSTETRICS & GYNECOLOGY

## 2024-09-26 PROCEDURE — 25010000002 TETANUS-DIPHTH-ACELL PERTUSSIS 5-2.5-18.5 LF-MCG/0.5 SUSPENSION PREFILLED SYRINGE: Performed by: OBSTETRICS & GYNECOLOGY

## 2024-09-26 PROCEDURE — 90472 IMMUNIZATION ADMIN EACH ADD: CPT | Performed by: OBSTETRICS & GYNECOLOGY

## 2024-09-26 PROCEDURE — 25010000002 MEASLES, MUMPS & RUBELLA VAC RECONSTITUTED SOLUTION: Performed by: OBSTETRICS & GYNECOLOGY

## 2024-09-26 RX ORDER — DOCUSATE SODIUM 100 MG/1
100 CAPSULE, LIQUID FILLED ORAL 2 TIMES DAILY
Qty: 60 CAPSULE | Refills: 1 | Status: SHIPPED | OUTPATIENT
Start: 2024-09-26

## 2024-09-26 RX ORDER — IBUPROFEN 600 MG/1
600 TABLET, FILM COATED ORAL EVERY 6 HOURS PRN
Qty: 60 TABLET | Refills: 1 | Status: SHIPPED | OUTPATIENT
Start: 2024-09-26

## 2024-09-26 RX ORDER — HYDROCODONE BITARTRATE AND ACETAMINOPHEN 5; 325 MG/1; MG/1
1-2 TABLET ORAL EVERY 6 HOURS PRN
Qty: 18 TABLET | Refills: 0 | Status: SHIPPED | OUTPATIENT
Start: 2024-09-26

## 2024-09-26 RX ADMIN — TETANUS TOXOID, REDUCED DIPHTHERIA TOXOID AND ACELLULAR PERTUSSIS VACCINE, ADSORBED 0.5 ML: 5; 2.5; 8; 8; 2.5 SUSPENSION INTRAMUSCULAR at 11:00

## 2024-09-26 RX ADMIN — MEASLES, MUMPS, AND RUBELLA VIRUS VACCINE LIVE 0.5 ML: 1000; 12500; 1000 INJECTION, POWDER, LYOPHILIZED, FOR SUSPENSION SUBCUTANEOUS at 10:57

## 2024-09-26 RX ADMIN — IBUPROFEN 600 MG: 600 TABLET, FILM COATED ORAL at 03:16

## 2024-09-26 RX ADMIN — DOCUSATE SODIUM 100 MG: 100 CAPSULE, LIQUID FILLED ORAL at 09:28

## 2024-09-26 RX ADMIN — ACETAMINOPHEN 650 MG: 325 TABLET ORAL at 06:39

## 2024-09-26 RX ADMIN — IBUPROFEN 600 MG: 600 TABLET, FILM COATED ORAL at 09:28

## 2024-09-26 NOTE — LACTATION NOTE
LC in to follow  up with lactation progress. Patient continues to latch baby at times and is still pumping and bottle feeding. Baby has only had breastmilk. Infant's output is over that what is expected and weight loss is down -7.8%. Patient has full breasts today and she last pumped 60 ml. When baby takes a bottle she is taking 30-40 ml. Patient is planning on discharge today. LC discussed normal infant output patterns to expect and if infant is not waking by 3 hours to wake and feed using measures shown in the hospital. LC discussed checking to make sure new medications are safe to breastfeed. LC discussed alcohol use and cigarette/second hand smoke around baby and breastfeeding and discussed the impact of street drugs on infants and breastfeeding. LC used the page in the breastfeeding guide to discuss harmful effects of these. Breastfeeding/Lactation expectations and anticipatory guidance discussed for the next two weeks . LC discussed nipple care, plugged ducts, engorgement, and breast infection. LC encouraged mom to see pediatrician two days from discharge for follow up. Patient has a breastpump for home use and LC discussed good pumping guidelines and normal expectations with pumping and storage and preparation of ebm for feedings. LC discussed breastfeeding/lactation resources including the local breastfeeding support group after discharge and when to call the doctor. Patient showed good understanding.

## 2024-09-29 ENCOUNTER — TELEPHONE (OUTPATIENT)
Dept: LACTATION | Facility: HOSPITAL | Age: 24
End: 2024-09-29
Payer: COMMERCIAL

## 2024-10-03 NOTE — DISCHARGE SUMMARY
"             [facility]         Discharge Summary    Patient Name: Genesis Carver  : 2000  MRN: [id number]  Primary Care Physician: Brooke Merrill APRN    Date of Admission: 2024  Date of Discharge: 2024    Consults       No orders found from 2024 to 2024.             Procedures:  Procedure(s):   SECTION REPEAT WITH TUBAL    Presenting Problem:   Previous  delivery affecting pregnancy [O34.219]    Admitting Diagnosis:  24 y.o.  at 39w0d    Previous  delivery affecting pregnancy    Short interval between pregnancies affecting pregnancy, antepartum    Unwanted fertility     Discharge Diagnosis:  24 y.o.  at 39w0d     delivery delivered    Previous  delivery affecting pregnancy    Short interval between pregnancies affecting pregnancy, antepartum    Unwanted fertility    Status post bilateral salpingectomy      Delivery Summary     OB Surgeon: Indio Soliz M.D.  Anesthesia: Spinal  Delivery Type:  LTCS, Bilateral salpingectomy  Perineum: OBPERINEUM: Intact  Feeding method: Breast    Infant: female  infant;    Weight: 3140 g (6 lb 14.8 oz)     APGARS: 8  @ 1 minute / 9  @ 5 minutes   Venous Blood Gas: No results found for: \"PHCVEN\", \"BECVEN\"   Arterial Blood Gas: No results found for: \"PHCART\", \"BECART\"     Hospital Course     Hospital Course:  Genesis Carver is a 24 y.o.  39w0d who presented on 2024 for a scheduled repeat  delivery with bilateral salpingectomy.  For full details of that procedure please see the separate dictated operative narrative.  After initial recovery in the PACU, the patient was transferred to the postpartum unit for further postpartum care.  She had an uncomplicated postpartum course.  Her Plummer catheter was removed on the the day of surgery.  She was able to void without difficulty.  Her postop day 1 labs were stable.  By postop day 2, day of discharge, the patient was doing " well.  She was afebrile her vital signs were stable throughout the hospital stay.  Her pain was well-controlled.  She was ambulating without difficulty.  She was voiding without difficulty.  She was tolerating a regular diet without nausea or vomiting.  She was passing flatus.  Her exam was benign.  Her incision was clean, dry, intact and well-healing.  Her infant was doing well.  She desired discharge home.      Pertinent  and/or Most Recent Results     LAB RESULTS:                 URINALYSIS@  Microbiology Results (last 10 days)       ** No results found for the last 240 hours. **         Results for orders placed in visit on 11/23/21    Adult Transthoracic Echo Complete W/ Cont if Necessary Per Protocol    Interpretation Summary  Normal left ventricular systolic function.  Trace tricuspid regurgitation      Discharge Details        Discharge Medications        New Medications        Instructions Start Date   docusate sodium 100 MG capsule  Commonly known as: Colace   100 mg, Oral, 2 Times Daily      HYDROcodone-acetaminophen 5-325 MG per tablet  Commonly known as: Norco   1-2 tablets, Oral, Every 6 Hours PRN      ibuprofen 600 MG tablet  Commonly known as: ADVIL,MOTRIN   600 mg, Oral, Every 6 Hours PRN             Continue These Medications        Instructions Start Date   PRENATAL+DHA PO   Oral               No Known Allergies    Discharge Disposition:   Home, self-care    Discharge Condition:  Good    Diet:   Regular    Discharge Activity:  Pelvic rest for 6 weeks, no intercourse for 6 weeks, no tampons or douching for 6 weeks, nothing in the vagina for 6 weeks  No driving for 2 weeks  No lifting more than 15 to 20 pounds for 2 weeks  No tub baths for 2 weeks, but may shower  Keep the incision clean and dry    Call the office or go to the emergency department for temperature greater than 100 °F, shortness of breath or chest pain, excessive nausea or vomiting, pain that is worsening despite current pain  medications, redness, swelling, or drainage from the incision site, vaginal bleeding soaking a pad in less than 1 hour.    Follow Up:  Future Appointments   Date Time Provider Department Center   10/30/2024  1:30 PM Indio Soliz MD [facility]       Electronically signed by Indio Soliz MD, 10/03/24, 8:59 AM EDT.

## 2024-10-30 ENCOUNTER — POSTPARTUM VISIT (OUTPATIENT)
Dept: OBSTETRICS AND GYNECOLOGY | Facility: CLINIC | Age: 24
End: 2024-10-30
Payer: COMMERCIAL

## 2024-10-30 VITALS
SYSTOLIC BLOOD PRESSURE: 114 MMHG | DIASTOLIC BLOOD PRESSURE: 76 MMHG | HEART RATE: 99 BPM | BODY MASS INDEX: 23.9 KG/M2 | HEIGHT: 64 IN | WEIGHT: 140 LBS

## 2024-10-30 DIAGNOSIS — Z90.79 STATUS POST BILATERAL SALPINGECTOMY: ICD-10-CM

## 2024-10-30 PROBLEM — O34.219 PREVIOUS CESAREAN DELIVERY AFFECTING PREGNANCY: Status: RESOLVED | Noted: 2024-03-21 | Resolved: 2024-10-30

## 2024-10-30 PROBLEM — Z30.09 UNWANTED FERTILITY: Status: RESOLVED | Noted: 2024-07-09 | Resolved: 2024-10-30

## 2024-10-30 PROBLEM — O09.899 SHORT INTERVAL BETWEEN PREGNANCIES AFFECTING PREGNANCY, ANTEPARTUM: Status: RESOLVED | Noted: 2024-03-21 | Resolved: 2024-10-30

## 2024-10-30 NOTE — PROGRESS NOTES
"[facility]  Postpartum Follow Up Visit    CC:  Postpartum follow up     Antepartum or Postpartum Complications: Prior   Delivery type:   LTCS, Bilateral salpingectomy  Perineum : Intact  Feeding: Bottle    Subjective:     Headache:  No  Vision changes:  No  RUQ/epigastric pain:  No  Swelling:  No  Pain:  No  Vaginal Bleeding:  No  Depressed/Anxious:  No  EPDS score: 6      Objective:   /76   Pulse 99   Ht 162.6 cm (64\")   Wt 63.5 kg (140 lb)   LMP 2023   BMI 24.03 kg/m²     Physical Exam  Vitals and nursing note reviewed. Exam conducted with a chaperone present.   Constitutional:       General: She is not in acute distress.     Appearance: Normal appearance. She is not ill-appearing.   Abdominal:      General: Abdomen is flat. There is no distension.      Palpations: Abdomen is soft. There is no mass.      Tenderness: There is no abdominal tenderness. There is no rebound.      Hernia: No hernia is present.      Comments: The incision is clean, dry, intact and well-healed.  There are no signs of infection.   Musculoskeletal:         General: No swelling.      Right lower leg: No edema.      Left lower leg: No edema.   Skin:     General: Skin is warm and dry.      Findings: No rash.   Neurological:      Mental Status: She is alert and oriented to person, place, and time.   Psychiatric:         Mood and Affect: Mood normal.         Behavior: Behavior normal.         Thought Content: Thought content normal.         Judgment: Judgment normal.       Last PAP:   Last Completed Pap Smear            PAP SMEAR (Every 3 Years) Next due on 3/19/2027      2024  IGP,CtNgTv,rfx Aptima HPV ASCU                    Assessment and Plan:  Diagnoses and all orders for this visit:    1. Encounter for postpartum visit (Primary)  Assessment & Plan:  Stable postpartum course  May resume all normal activities  Recommend OTC NSAIDs for any further postpartum/postop pain      2. "  delivery delivered    3. Status post bilateral salpingectomy        Counseling:  Ok to resume intercourse  May resume normal activities  Core strengthening exercises reviewed and recommended  Kegel exercises reviewed and recommended  Ok to return to work/school once patient desires/maternity leave completed    Follow Up:  Return for Annual physical.    Indio Soliz MD  10/30/2024

## 2024-10-30 NOTE — ASSESSMENT & PLAN NOTE
Stable postpartum course  May resume all normal activities  Recommend OTC NSAIDs for any further postpartum/postop pain

## (undated) DEVICE — SUT MNCRYL 0/0 CTX 36IN Y398H

## (undated) DEVICE — CVR HNDL LT SURG ACCSSRY BLU STRL

## (undated) DEVICE — SUT VIC 3/0 CTI 36IN J944H

## (undated) DEVICE — C SECTION PACK: Brand: MEDLINE INDUSTRIES, INC.

## (undated) DEVICE — SUT MNCRYL 0 CT1 27IN VIL

## (undated) DEVICE — NEEDLE,18GX1.5",REG,BEVEL: Brand: MEDLINE

## (undated) DEVICE — STERILE POLYISOPRENE POWDER-FREE SURGICAL GLOVES WITH EMOLLIENT COATING: Brand: PROTEXIS

## (undated) DEVICE — GLV SURG BIOGEL LTX PF 7

## (undated) DEVICE — VIOLET BRAIDED (POLYGLACTIN 910), SYNTHETIC ABSORBABLE SUTURE: Brand: COATED VICRYL

## (undated) DEVICE — TRY CATH FOL ADVANCE SIL W/BAG 16F

## (undated) DEVICE — INTENDED FOR TISSUE SEPARATION, AND OTHER PROCEDURES THAT REQUIRE A SHARP SURGICAL BLADE TO PUNCTURE OR CUT.: Brand: BARD-PARKER ® CARBON RIB-BACK BLADES

## (undated) DEVICE — PAD GRND REM POLYHESIVE A/ DISP

## (undated) DEVICE — DEV TRANSF BLD W/LUER ADPT CA/198

## (undated) DEVICE — Device: Brand: PORTEX

## (undated) DEVICE — SUT CHRM 0 CT1 36IN 924H

## (undated) DEVICE — SUT MNCRYL 4/0 PS2 18 IN